# Patient Record
Sex: FEMALE | Race: BLACK OR AFRICAN AMERICAN | Employment: FULL TIME | URBAN - METROPOLITAN AREA
[De-identification: names, ages, dates, MRNs, and addresses within clinical notes are randomized per-mention and may not be internally consistent; named-entity substitution may affect disease eponyms.]

---

## 2017-03-11 DIAGNOSIS — I10 ESSENTIAL HYPERTENSION: Primary | ICD-10-CM

## 2017-03-13 RX ORDER — AMLODIPINE BESYLATE 5 MG/1
TABLET ORAL
Qty: 90 TABLET | Refills: 0 | Status: SHIPPED | OUTPATIENT
Start: 2017-03-13 | End: 2017-06-12

## 2017-03-15 ENCOUNTER — TELEPHONE (OUTPATIENT)
Dept: FAMILY MEDICINE CLINIC | Facility: CLINIC | Age: 57
End: 2017-03-15

## 2017-03-15 DIAGNOSIS — E05.00 GRAVES' DISEASE: ICD-10-CM

## 2017-03-15 DIAGNOSIS — Z00.00 LABORATORY EXAMINATION ORDERED AS PART OF A ROUTINE GENERAL MEDICAL EXAMINATION: Primary | ICD-10-CM

## 2017-03-15 NOTE — TELEPHONE ENCOUNTER
Patient is scheduled for a physical 08/07/17 and would like to have blood work done prior to appointment.  Please place orders to [facility] labs

## 2017-04-24 ENCOUNTER — TELEPHONE (OUTPATIENT)
Dept: FAMILY MEDICINE CLINIC | Facility: CLINIC | Age: 57
End: 2017-04-24

## 2017-04-24 NOTE — TELEPHONE ENCOUNTER
Called and talked to patient discussed getting B12 and what it does and doesn't do she will wait until appointment to discuss this with Dr Donavan Severe

## 2017-04-24 NOTE — TELEPHONE ENCOUNTER
Pt calling on status of B12 question. Was informed that message was sent to Dr Tom Donahue. (cell # [phone]. Michelle Pencil ok to leave message)

## 2017-04-24 NOTE — TELEPHONE ENCOUNTER
Pt is wondering if she can have a vitamin B-12 shot she is thinking that she would need this once a week.   She takes thyroid and blood pressure medicine and she believes it is giving her low energy and she was reccommended to take vitamins and she would ra

## 2017-04-24 NOTE — TELEPHONE ENCOUNTER
We can give the shot, but it will likely not be covered, also, it does not replace a multi vitamin or a b complex vaccine. Ideally she should discuss at a visit.

## 2017-06-12 DIAGNOSIS — I10 ESSENTIAL HYPERTENSION: Primary | ICD-10-CM

## 2017-06-14 RX ORDER — AMLODIPINE BESYLATE 5 MG/1
TABLET ORAL
Qty: 90 TABLET | Refills: 0 | Status: SHIPPED | OUTPATIENT
Start: 2017-06-14 | End: 2017-08-16

## 2017-06-14 NOTE — TELEPHONE ENCOUNTER
LOV 8/6/16 and at that time, pt was advised to follow up in 3 months.  Future Appointments  Date Time Provider Carlos Eduardo Preciado   6/27/2017 3:40 PM Earlyne Badger, MD Randalyn Corona Lazarus Gum   7/28/2017 7:15 AM REF JEFFY REF EMG3 Ref Jeffy   8/7/2017 12:45 P

## 2017-07-06 ENCOUNTER — TELEPHONE (OUTPATIENT)
Dept: FAMILY MEDICINE CLINIC | Facility: CLINIC | Age: 57
End: 2017-07-06

## 2017-07-06 NOTE — TELEPHONE ENCOUNTER
Patient came back from [facility], ongoing diarrhea. Prefers to speak to nurse before scheduling an appointment. Offered appt tomorrow with Dr. Jv Sims.

## 2017-07-06 NOTE — TELEPHONE ENCOUNTER
Has had diarrhea from Sunday when they got back from vacation, she asked the pharmacist what to do and they suggested the [address] but told her along with clear liquids it was OK to eat crackers.  So I told her that she should start out with 24 hours of cl

## 2017-07-07 ENCOUNTER — LAB ENCOUNTER (OUTPATIENT)
Dept: LAB | Facility: HOSPITAL | Age: 57
End: 2017-07-07
Attending: FAMILY MEDICINE
Payer: COMMERCIAL

## 2017-07-07 ENCOUNTER — OFFICE VISIT (OUTPATIENT)
Dept: FAMILY MEDICINE CLINIC | Facility: CLINIC | Age: 57
End: 2017-07-07

## 2017-07-07 VITALS
HEART RATE: 74 BPM | WEIGHT: 215 LBS | HEIGHT: 69 IN | DIASTOLIC BLOOD PRESSURE: 70 MMHG | SYSTOLIC BLOOD PRESSURE: 118 MMHG | BODY MASS INDEX: 31.84 KG/M2 | RESPIRATION RATE: 14 BRPM | TEMPERATURE: 99 F

## 2017-07-07 DIAGNOSIS — A09 TRAVELER'S DIARRHEA: Primary | ICD-10-CM

## 2017-07-07 DIAGNOSIS — A09 TRAVELER'S DIARRHEA: ICD-10-CM

## 2017-07-07 PROCEDURE — 87046 STOOL CULTR AEROBIC BACT EA: CPT

## 2017-07-07 PROCEDURE — 87045 FECES CULTURE AEROBIC BACT: CPT

## 2017-07-07 PROCEDURE — 87427 SHIGA-LIKE TOXIN AG IA: CPT

## 2017-07-07 PROCEDURE — 87077 CULTURE AEROBIC IDENTIFY: CPT

## 2017-07-07 PROCEDURE — 99213 OFFICE O/P EST LOW 20 MIN: CPT | Performed by: FAMILY MEDICINE

## 2017-07-07 PROCEDURE — 87493 C DIFF AMPLIFIED PROBE: CPT

## 2017-07-07 RX ORDER — CIPROFLOXACIN 500 MG/1
500 TABLET, FILM COATED ORAL 2 TIMES DAILY
Qty: 20 TABLET | Refills: 0 | Status: SHIPPED | OUTPATIENT
Start: 2017-07-07 | End: 2017-07-17

## 2017-07-07 NOTE — PROGRESS NOTES
Patient presents with:  Diarrhea: x7 days w/ diarrhea that is loose pt is unable to keep any food down. PT is had 7 BM today already.  Pt needs note for work      HPI:   This is a 61year old female coming in for area    [facility] x 5 days, [address] distress. Abdominal: Soft. Normal appearance and bowel sounds are normal. She exhibits no shifting dullness, no distension and no mass. There is no hepatosplenomegaly. There is generalized tenderness.  There is no rigidity, no rebound, no guarding, no CVA

## 2017-07-07 NOTE — PATIENT INSTRUCTIONS
Treating Diarrhea    Diarrhea happens when you have loose, watery, or frequent bowel movements. It is a common problem with many causes. Most cases of diarrhea clear up on their own. But certain cases may need treatment.  Be sure to see your healthcare pr © 2897-8379 [address], [address]. All rights reserved. This information is not intended as a substitute for professional medical care. Always follow your healthcare professional's instructions.

## 2017-07-11 ENCOUNTER — TELEPHONE (OUTPATIENT)
Dept: FAMILY MEDICINE CLINIC | Facility: CLINIC | Age: 57
End: 2017-07-11

## 2017-07-12 ENCOUNTER — TELEPHONE (OUTPATIENT)
Dept: FAMILY MEDICINE CLINIC | Facility: CLINIC | Age: 57
End: 2017-07-12

## 2017-07-12 NOTE — TELEPHONE ENCOUNTER
Spoke with Natalia Ruvalcaba, telling her Iza Vargas extended her return to work date to 7/20/17. Told her to increase her diet gradually and to add an OTC probiotic to help speed up recovery per . She is now concerned she has not moved her bowels for 2 days.

## 2017-07-12 NOTE — TELEPHONE ENCOUNTER
Eyad Velazco for extended note through 7/20 and OK for gradual increase in diet.  Ok for probiotic OTC to see if it speeds up process of recovery

## 2017-07-12 NOTE — PROGRESS NOTES
Discussed Stool culture results with Kathrine Webb by phone. [place] [employer] understanding. She is doing a lot better but still has no appetite and is afraid to eat anything but the BRAT diet suggested by Dorothy Young.   She took 5 days of Cipro but stopped [place]

## 2017-07-12 NOTE — TELEPHONE ENCOUNTER
Discussed Stool culture results with Angelo Woodward by phone regarding traveler's diarrhea. Caron Martínez understanding.  She is doing a lot better but still has no appetite and is afraid to eat anything but the BRAT diet suggested by Vidya Camara. Angelica Whitaker took 5 d

## 2017-07-12 NOTE — TELEPHONE ENCOUNTER
----- Message from Nicole Yao MD sent at 7/11/2017  6:57 PM CDT -----  Mild infection, this is a travelers diarrhea bug, and usually clears without abx. cipro is abx of choice if needed, and I started her on cipro last week.  Ok to notify and make sure sh

## 2017-08-09 ENCOUNTER — LABORATORY ENCOUNTER (OUTPATIENT)
Dept: LAB | Age: 57
End: 2017-08-09
Attending: FAMILY MEDICINE
Payer: COMMERCIAL

## 2017-08-09 DIAGNOSIS — Z00.00 LABORATORY EXAMINATION ORDERED AS PART OF A ROUTINE GENERAL MEDICAL EXAMINATION: ICD-10-CM

## 2017-08-09 DIAGNOSIS — E89.0 POSTABLATIVE HYPOTHYROIDISM: ICD-10-CM

## 2017-08-09 DIAGNOSIS — E55.9 VITAMIN D DEFICIENCY: ICD-10-CM

## 2017-08-09 LAB
ALBUMIN SERPL-MCNC: 3.7 G/DL (ref 3.5–4.8)
ALP LIVER SERPL-CCNC: 87 U/L (ref 46–118)
ALT SERPL-CCNC: 15 U/L (ref 14–54)
AST SERPL-CCNC: 11 U/L (ref 15–41)
BASOPHILS # BLD AUTO: 0.02 X10(3) UL (ref 0–0.1)
BASOPHILS NFR BLD AUTO: 0.3 %
BILIRUB SERPL-MCNC: 0.5 MG/DL (ref 0.1–2)
BUN BLD-MCNC: 13 MG/DL (ref 8–20)
CALCIUM BLD-MCNC: 9.3 MG/DL (ref 8.3–10.3)
CHLORIDE: 108 MMOL/L (ref 101–111)
CHOLEST SMN-MCNC: 186 MG/DL (ref ?–200)
CO2: 26 MMOL/L (ref 22–32)
CREAT BLD-MCNC: 1.14 MG/DL (ref 0.55–1.02)
EOSINOPHIL # BLD AUTO: 0.04 X10(3) UL (ref 0–0.3)
EOSINOPHIL NFR BLD AUTO: 0.6 %
ERYTHROCYTE [DISTWIDTH] IN BLOOD BY AUTOMATED COUNT: 14.2 % (ref 11.5–16)
GLUCOSE BLD-MCNC: 95 MG/DL (ref 70–99)
HCT VFR BLD AUTO: 37.4 % (ref 34–50)
HDLC SERPL-MCNC: 43 MG/DL (ref 45–?)
HDLC SERPL: 4.33 {RATIO} (ref ?–4.44)
HGB BLD-MCNC: 11.9 G/DL (ref 12–16)
IMMATURE GRANULOCYTE COUNT: 0.01 X10(3) UL (ref 0–1)
IMMATURE GRANULOCYTE RATIO %: 0.2 %
LDLC SERPL CALC-MCNC: 124 MG/DL (ref ?–130)
LDLC SERPL-MCNC: 19 MG/DL (ref 5–40)
LYMPHOCYTES # BLD AUTO: 3.55 X10(3) UL (ref 0.9–4)
LYMPHOCYTES NFR BLD AUTO: 56.9 %
M PROTEIN MFR SERPL ELPH: 7.6 G/DL (ref 6.1–8.3)
MCH RBC QN AUTO: 28.2 PG (ref 27–33.2)
MCHC RBC AUTO-ENTMCNC: 31.8 G/DL (ref 31–37)
MCV RBC AUTO: 88.6 FL (ref 81–100)
MONOCYTES # BLD AUTO: 0.45 X10(3) UL (ref 0.1–0.6)
MONOCYTES NFR BLD AUTO: 7.2 %
NEUTROPHIL ABS PRELIM: 2.17 X10 (3) UL (ref 1.3–6.7)
NEUTROPHILS # BLD AUTO: 2.17 X10(3) UL (ref 1.3–6.7)
NEUTROPHILS NFR BLD AUTO: 34.8 %
NONHDLC SERPL-MCNC: 143 MG/DL (ref ?–130)
PLATELET # BLD AUTO: 231 10(3)UL (ref 150–450)
POTASSIUM SERPL-SCNC: 3.8 MMOL/L (ref 3.6–5.1)
RBC # BLD AUTO: 4.22 X10(6)UL (ref 3.8–5.1)
RED CELL DISTRIBUTION WIDTH-SD: 45.6 FL (ref 35.1–46.3)
SODIUM SERPL-SCNC: 142 MMOL/L (ref 136–144)
TRIGLYCERIDES: 93 MG/DL (ref ?–150)
TSI SER-ACNC: 2.05 MIU/ML (ref 0.35–5.5)
WBC # BLD AUTO: 6.2 X10(3) UL (ref 4–13)

## 2017-08-09 PROCEDURE — 80053 COMPREHEN METABOLIC PANEL: CPT

## 2017-08-09 PROCEDURE — 85025 COMPLETE CBC W/AUTO DIFF WBC: CPT

## 2017-08-09 PROCEDURE — 36415 COLL VENOUS BLD VENIPUNCTURE: CPT

## 2017-08-09 PROCEDURE — 84443 ASSAY THYROID STIM HORMONE: CPT

## 2017-08-09 PROCEDURE — 80061 LIPID PANEL: CPT

## 2017-08-09 NOTE — PROGRESS NOTES
Please let pt know thyroid is normal but we can try to increase dose a little more. Stop all current dose of levothyroxine. Increase to levothyroxine 112 mcg daily and repeat blood work in 2 months, around 10/9.   Thanks,  Calvin's

## 2017-08-10 NOTE — PROGRESS NOTES
Telephone Information:  Mobile          [phone]    [facility] regarding Dr. Mike Pimentel result note. Hours and number given.

## 2017-08-11 NOTE — PROGRESS NOTES
Patient informed of Dr. Thacker Patch note. Patient verbalized understanding and agrees with plan.     Orders were placed and script was sent via

## 2017-08-11 NOTE — PROGRESS NOTES
[phone] (home)   Telephone Information:  Mobile          [phone] regarding Dr. Duff Face result note. Hours and number given.

## 2017-08-14 RX ORDER — ERGOCALCIFEROL 1.25 MG/1
50000 CAPSULE ORAL
Refills: 3 | COMMUNITY
Start: 2017-05-21 | End: 2017-10-13

## 2017-08-16 ENCOUNTER — OFFICE VISIT (OUTPATIENT)
Dept: FAMILY MEDICINE CLINIC | Facility: CLINIC | Age: 57
End: 2017-08-16

## 2017-08-16 VITALS
WEIGHT: 216.19 LBS | SYSTOLIC BLOOD PRESSURE: 110 MMHG | HEIGHT: 68.8 IN | RESPIRATION RATE: 16 BRPM | BODY MASS INDEX: 32.02 KG/M2 | DIASTOLIC BLOOD PRESSURE: 70 MMHG | TEMPERATURE: 98 F | HEART RATE: 68 BPM

## 2017-08-16 DIAGNOSIS — Z01.419 WELL WOMAN EXAM WITH ROUTINE GYNECOLOGICAL EXAM: ICD-10-CM

## 2017-08-16 DIAGNOSIS — Z00.00 ANNUAL PHYSICAL EXAM: Primary | ICD-10-CM

## 2017-08-16 DIAGNOSIS — I10 ESSENTIAL HYPERTENSION: ICD-10-CM

## 2017-08-16 DIAGNOSIS — Z12.31 VISIT FOR SCREENING MAMMOGRAM: ICD-10-CM

## 2017-08-16 DIAGNOSIS — R39.15 URGENCY OF URINATION: ICD-10-CM

## 2017-08-16 PROCEDURE — 87624 HPV HI-RISK TYP POOLED RSLT: CPT | Performed by: FAMILY MEDICINE

## 2017-08-16 PROCEDURE — 99396 PREV VISIT EST AGE 40-64: CPT | Performed by: FAMILY MEDICINE

## 2017-08-16 PROCEDURE — 88175 CYTOPATH C/V AUTO FLUID REDO: CPT | Performed by: FAMILY MEDICINE

## 2017-08-16 RX ORDER — AMLODIPINE BESYLATE 5 MG/1
5 TABLET ORAL DAILY
Qty: 90 TABLET | Refills: 3 | Status: SHIPPED | OUTPATIENT
Start: 2017-08-16 | End: 2018-08-20

## 2017-08-16 NOTE — PATIENT INSTRUCTIONS
Anatomy of the Female Urinary Tract  Your urinary tract helps get rid of urine (your body’s liquid waste). The kidneys collect chemicals and water your body doesn’t need. This is turned into urine.  Urine travels out of the kidneys through the ureters to · Losing weight. Excess weight puts extra pressure on the pelvic floor muscles. Exercising and eating right can help you lose weight. This helps other treatments work better. · Making certain diet changes.  Some foods may make you need to urinate more, so Urge incontinence (also called overactive bladder): With this type, a sudden urge to urinate is felt often. This may happen even though there may not be much urine in the bladder. The need to urinate often during the night is common.  Urge incontinence most · Quit smoking. Smoking and other tobacco use can lead to chronic cough that strains the pelvic floor muscles. Smoking may also damage the bladder and urethra. Talk with your provider about treatments or methods you can use to quit smoking.   · If drinking Urinary incontinence is the leaking of urine from the bladder. In some cases, medication can reduce or stop the leaking. It is mainly given for urge incontinence.  Your doctor will talk with you about your options.  Make sure to ask what side effects to exp

## 2017-08-16 NOTE — ASSESSMENT & PLAN NOTE
Handouts on Jaqueline's given, uro-GYN if no great improvement, we discussed pathophysiology and all treatment options as well

## 2017-08-16 NOTE — PROGRESS NOTES
Barrera Mendez is a 61year old female who presents for a complete physical exam.   HPI:   Patient presents with:   Well Adult: WWE with Pap  Urinary Urgency: related to age, not UTI      Her last annual assessment has been over 1 year: Annual Physical due 143 (H) 08/09/2017 07:09 AM         Lab Results  Component Value Date/Time   A1C 5.8 (H) 12/22/2014 12:11 PM   VITD 31.83 06/07/2017 03:50 PM          Last Heather category :Si Body due on 08/12/2017   [unfilled]  No results found.         Current Out negatives noted in the the HPI.   Specifically:  GEN:  No fever or fatigue  HEAD:  No headaches  EYES:  No vision change  EARS:  No hearing loss  MOUTH/THROAT:  No sore throat or dental problems  HEART:  No chest pain or palpitations  LUNG:  No SOB, cough o and no tenderness. Left breast exhibits no inverted nipple and no tenderness. Breasts are symmetrical.   Abdominal: Soft. Bowel sounds are normal. She exhibits no distension. There is no hepatosplenomegaly. There is no tenderness.  There is no rebound and n exam    -  Primary    Well woman exam with routine gynecological exam        Visit for screening mammogram        Relevant Orders    AYAN SCREENING BILAT (CPT=77067)         Return in about 6 months (around 2/16/2018) for recheck.     Maritza Ballard MD, 8/16/2

## 2017-08-17 LAB — HPV I/H RISK 1 DNA SPEC QL NAA+PROBE: NEGATIVE

## 2017-08-18 LAB — LAST PAP RESULT: NORMAL

## 2017-08-19 NOTE — PROGRESS NOTES
Discussed pap results with patient by phone telling her it was normal and to repeat the pap in 2-3 years per Clinton Lorenzo. She should return for a physical in 1 year. Patient verbalizes understanding.

## 2017-08-24 ENCOUNTER — HOSPITAL ENCOUNTER (OUTPATIENT)
Dept: MAMMOGRAPHY | Age: 57
Discharge: HOME | End: 2017-08-24
Attending: FAMILY MEDICINE
Payer: COMMERCIAL

## 2017-08-24 DIAGNOSIS — Z12.31 VISIT FOR SCREENING MAMMOGRAM: ICD-10-CM

## 2017-08-24 PROCEDURE — 77067 SCR MAMMO BI INCL CAD: CPT | Performed by: FAMILY MEDICINE

## 2017-11-29 ENCOUNTER — APPOINTMENT (OUTPATIENT)
Dept: GENERAL RADIOLOGY | Facility: HOSPITAL | Age: 57
End: 2017-11-29
Attending: EMERGENCY MEDICINE
Payer: COMMERCIAL

## 2017-11-29 ENCOUNTER — HOSPITAL ENCOUNTER (EMERGENCY)
Facility: HOSPITAL | Age: 57
Discharge: HOME | End: 2017-11-29
Attending: EMERGENCY MEDICINE
Payer: COMMERCIAL

## 2017-11-29 ENCOUNTER — TELEPHONE (OUTPATIENT)
Dept: FAMILY MEDICINE CLINIC | Facility: CLINIC | Age: 57
End: 2017-11-29

## 2017-11-29 VITALS
BODY MASS INDEX: 29.35 KG/M2 | TEMPERATURE: 98 F | OXYGEN SATURATION: 99 % | RESPIRATION RATE: 17 BRPM | HEART RATE: 62 BPM | DIASTOLIC BLOOD PRESSURE: 87 MMHG | HEIGHT: 70 IN | SYSTOLIC BLOOD PRESSURE: 127 MMHG | WEIGHT: 205 LBS

## 2017-11-29 DIAGNOSIS — R07.89 CHEST PAIN, NON-CARDIAC: Primary | ICD-10-CM

## 2017-11-29 PROCEDURE — 93005 ELECTROCARDIOGRAM TRACING: CPT

## 2017-11-29 PROCEDURE — 81003 URINALYSIS AUTO W/O SCOPE: CPT | Performed by: PHYSICIAN ASSISTANT

## 2017-11-29 PROCEDURE — 36415 COLL VENOUS BLD VENIPUNCTURE: CPT

## 2017-11-29 PROCEDURE — 93010 ELECTROCARDIOGRAM REPORT: CPT

## 2017-11-29 PROCEDURE — 84484 ASSAY OF TROPONIN QUANT: CPT | Performed by: EMERGENCY MEDICINE

## 2017-11-29 PROCEDURE — 85025 COMPLETE CBC W/AUTO DIFF WBC: CPT | Performed by: EMERGENCY MEDICINE

## 2017-11-29 PROCEDURE — 99285 EMERGENCY DEPT VISIT HI MDM: CPT

## 2017-11-29 PROCEDURE — 71010 XR CHEST AP PORTABLE  (CPT=71010): CPT | Performed by: EMERGENCY MEDICINE

## 2017-11-29 PROCEDURE — 80053 COMPREHEN METABOLIC PANEL: CPT | Performed by: EMERGENCY MEDICINE

## 2017-11-29 RX ORDER — ASPIRIN 81 MG/1
324 TABLET, CHEWABLE ORAL ONCE
Status: COMPLETED | OUTPATIENT
Start: 2017-11-29 | End: 2017-11-29

## 2017-11-29 NOTE — ED PROVIDER NOTES
Patient Seen in: [facility] Emergency Department    History   Patient presents with:  Chest Pain Angina (cardiovascular)    Stated Complaint: chest pain    HPI   CHIEF COMPLAINT: Chest pressure, left jaw pain    HISTORY OF PRESENT ILLNESS: Patient i History:   and :   1996: CHOLECYSTECTOMY  2010: COLONOSCOPY      Comment: Dr. Imtiaz Mosqueda- repeat in 5 yrs d/t suboptimal               prep                           Smoking status: Never Smoker Lymphocyte Absolute 4.01 (*)     All other components within normal limits   COMP METABOLIC PANEL (14) - Normal   TROPONIN I - Normal   TROPONIN I - Normal   CBC WITH DIFFERENTIAL WITH PLATELET    Narrative:      The following orders were created for panel (primary encounter diagnosis)    Disposition:  Discharge  11/29/2017  8:06 pm    Follow-up:  Edgar Lee MD  [address] [facility] [address] [place] [address] [phone]    Schedule an appointment as soon as possible for a visit in 3 days          [address] [place] [address]

## 2017-11-29 NOTE — TELEPHONE ENCOUNTER
Pt states that she is having left jaw pain since 11 am and mild chest pain. Denies SOB or diaphoresis. Advised that pt go to ER. She verbalized understanding. Routed to Dr Donavan Severe for sign off.

## 2017-11-29 NOTE — ED INITIAL ASSESSMENT (HPI)
Chest pain x 2 weeks intermittently. Pt states pain is tight and central chest.  Pt states left jaw pain today, pt states pain when moving jaw. Pt states pain is resolving. Pt also noted tingling to left arm.

## 2017-11-30 ENCOUNTER — TELEPHONE (OUTPATIENT)
Dept: FAMILY MEDICINE CLINIC | Facility: CLINIC | Age: 57
End: 2017-11-30

## 2017-11-30 DIAGNOSIS — I10 ESSENTIAL HYPERTENSION: ICD-10-CM

## 2017-11-30 DIAGNOSIS — R07.89 CHEST PRESSURE: Primary | ICD-10-CM

## 2017-11-30 NOTE — TELEPHONE ENCOUNTER
Ok for stress echo    Diagnoses and all orders for this visit:    Chest pressure  -     CARD ECHO STRESS ECHO/REST AND STRESS(CPT=93350/00231 DMG 25057);  Future    Essential hypertension  -     CARD ECHO STRESS ECHO/REST AND STRESS(CPT=93350/19068 DMG 9335

## 2017-11-30 NOTE — ED PROVIDER NOTES
I reviewed that chart and discussed the case. I have examined the patient and noted normal heart and lung exam.  Patient admits to being under a lot of stress related to work. She is afraid she is going to be laid off.     Electrocardiogram #1 at 1705 as

## 2017-11-30 NOTE — TELEPHONE ENCOUNTER
Patient was seen in ED yesterday for chest pain work up normal except EKG cannot  r/o anterior infarct they wanted her to get a stress test she would like to get this before seeing Dr Sabina Qureshi I will ask Dr Sabina Qureshi about this

## 2017-12-13 ENCOUNTER — HOSPITAL ENCOUNTER (OUTPATIENT)
Dept: CV DIAGNOSTICS | Facility: HOSPITAL | Age: 57
Discharge: HOME | End: 2017-12-13
Attending: FAMILY MEDICINE
Payer: COMMERCIAL

## 2017-12-13 DIAGNOSIS — I10 ESSENTIAL HYPERTENSION: ICD-10-CM

## 2017-12-13 DIAGNOSIS — R07.89 CHEST PRESSURE: ICD-10-CM

## 2017-12-13 PROCEDURE — 93018 CV STRESS TEST I&R ONLY: CPT | Performed by: FAMILY MEDICINE

## 2017-12-13 PROCEDURE — 93017 CV STRESS TEST TRACING ONLY: CPT | Performed by: FAMILY MEDICINE

## 2017-12-13 PROCEDURE — 93350 STRESS TTE ONLY: CPT | Performed by: FAMILY MEDICINE

## 2017-12-14 ENCOUNTER — OFFICE VISIT (OUTPATIENT)
Dept: FAMILY MEDICINE CLINIC | Facility: CLINIC | Age: 57
End: 2017-12-14

## 2017-12-14 VITALS
SYSTOLIC BLOOD PRESSURE: 126 MMHG | WEIGHT: 224 LBS | RESPIRATION RATE: 16 BRPM | BODY MASS INDEX: 32 KG/M2 | HEART RATE: 70 BPM | DIASTOLIC BLOOD PRESSURE: 76 MMHG | TEMPERATURE: 98 F

## 2017-12-14 DIAGNOSIS — K13.79 ORAL MASS: Primary | ICD-10-CM

## 2017-12-14 PROCEDURE — 99213 OFFICE O/P EST LOW 20 MIN: CPT | Performed by: PHYSICIAN ASSISTANT

## 2017-12-14 RX ORDER — CLINDAMYCIN HYDROCHLORIDE 150 MG/1
150 CAPSULE ORAL 3 TIMES DAILY
COMMUNITY
End: 2018-08-20 | Stop reason: ALTCHOICE

## 2017-12-15 ENCOUNTER — HOSPITAL ENCOUNTER (OUTPATIENT)
Dept: ULTRASOUND IMAGING | Age: 57
Discharge: HOME | End: 2017-12-15
Attending: PHYSICIAN ASSISTANT
Payer: COMMERCIAL

## 2017-12-15 DIAGNOSIS — K13.79 ORAL MASS: ICD-10-CM

## 2017-12-15 PROCEDURE — 76536 US EXAM OF HEAD AND NECK: CPT | Performed by: PHYSICIAN ASSISTANT

## 2017-12-15 NOTE — PROGRESS NOTES
CC:  Patient presents with:  Swelling: jaw swelling saw dentist has had this for 2 weeks now on Lt side dentist stated it was not dental in nature pain with touch denies fever       HPI: Mildred Gaston presents with complaints of painful swelling of the left buc reviewed.     Constitutional: Vital signs reviewed as noted; well developed, well nourished; in no acute distress  HENT:  Head: Normocephalic, atraumatic  Ears: Normal external ears; canals clear; TMs clear with landmarks visible  Nose: No edema, bleeding, prescriptions requested or ordered in this encounter

## 2017-12-18 ENCOUNTER — TELEPHONE (OUTPATIENT)
Dept: FAMILY MEDICINE CLINIC | Facility: CLINIC | Age: 57
End: 2017-12-18

## 2017-12-19 ENCOUNTER — HOSPITAL ENCOUNTER (OUTPATIENT)
Dept: CT IMAGING | Facility: HOSPITAL | Age: 57
Discharge: HOME | End: 2017-12-19
Attending: PHYSICIAN ASSISTANT
Payer: COMMERCIAL

## 2017-12-19 DIAGNOSIS — K11.8 PAROTID MASS: ICD-10-CM

## 2017-12-19 DIAGNOSIS — K11.20 SIALADENITIS: ICD-10-CM

## 2017-12-19 PROCEDURE — 70492 CT SFT TSUE NCK W/O & W/DYE: CPT | Performed by: PHYSICIAN ASSISTANT

## 2017-12-20 NOTE — PROGRESS NOTES
Informed pt of results per KO: ct neck is showing no significant abnormalities seen in the area of concern, recommend a follow up in office with Dr Wallace Hernandez in the next week to re examine with the films. Overall negative scan    Pt voiced understanding.   Gerard Garcia

## 2017-12-20 NOTE — PROGRESS NOTES
Please inform ct neck is showing no significant abnormalities seen in the area of concern, recommend a follow up in office with Dr Connie Avelar in the next week to re examine with the films.  Overall negative scan

## 2018-01-03 ENCOUNTER — TELEPHONE (OUTPATIENT)
Dept: FAMILY MEDICINE CLINIC | Facility: CLINIC | Age: 58
End: 2018-01-03

## 2018-01-03 NOTE — TELEPHONE ENCOUNTER
Called and talked to patient she fell yesterday in the bath tub and had LOC maybe up to 20 min seen by paramedics but refused transport to the hospital now she has headache balance is ok A&O X3 speech clear she is at work now will talk to Dr Delbert Ortega about [facility]

## 2018-01-03 NOTE — TELEPHONE ENCOUNTER
Called patient back and made an appointment for tomorrow at 10:45 with Dr Ernesto High discussed warning symptoms of head injury headache blurred vision slurred speech increased somnolence she agreed to go to hospital if things worsen

## 2018-01-03 NOTE — TELEPHONE ENCOUNTER
Patient fell getting out of tub yesterday, paramedics were called, patient refused to go to ER. Patient does have a knot on her head and has headache.  Patient does not want to go to ER but is looking to be seen today

## 2018-01-03 NOTE — TELEPHONE ENCOUNTER
Coming into the office would be better option unless she gets more more confused in which case she should go right to the emergency room.   Sometimes there can be mental confusion 7-10 days after a loss of consciousness like this, and in that case she shoul

## 2018-01-04 ENCOUNTER — OFFICE VISIT (OUTPATIENT)
Dept: FAMILY MEDICINE CLINIC | Facility: CLINIC | Age: 58
End: 2018-01-04

## 2018-01-04 VITALS
DIASTOLIC BLOOD PRESSURE: 64 MMHG | HEART RATE: 76 BPM | SYSTOLIC BLOOD PRESSURE: 110 MMHG | RESPIRATION RATE: 16 BRPM | TEMPERATURE: 97 F

## 2018-01-04 DIAGNOSIS — S06.0X1A CONCUSSION WITH LOSS OF CONSCIOUSNESS OF 30 MINUTES OR LESS, INITIAL ENCOUNTER: ICD-10-CM

## 2018-01-04 DIAGNOSIS — R55 VASOVAGAL SYNCOPE: Primary | ICD-10-CM

## 2018-01-04 PROCEDURE — 99214 OFFICE O/P EST MOD 30 MIN: CPT | Performed by: FAMILY MEDICINE

## 2018-01-04 NOTE — PROGRESS NOTES
Patient presents with:  Fall: On tuesday patient fell in the bath tub. Pt has bruises on left side and hit head with LOC. HPI:   This is a 61year old female coming in for loss of consciousness in the bathroom after taking a bath 2 days ago.   She rem difficulty urinating. Musculoskeletal: Negative for joint swelling. Skin: Negative. Negative for rash. Neurological: Positive for syncope. Negative for dizziness, facial asymmetry and weakness.    Psychiatric/Behavioral: Negative for confusion and ag

## 2018-08-10 ENCOUNTER — TELEPHONE (OUTPATIENT)
Dept: FAMILY MEDICINE CLINIC | Facility: CLINIC | Age: 58
End: 2018-08-10

## 2018-08-10 DIAGNOSIS — Z12.31 VISIT FOR SCREENING MAMMOGRAM: ICD-10-CM

## 2018-08-10 DIAGNOSIS — Z11.59 ENCOUNTER FOR HEPATITIS C SCREENING TEST FOR LOW RISK PATIENT: ICD-10-CM

## 2018-08-10 DIAGNOSIS — Z00.00 LABORATORY EXAMINATION ORDERED AS PART OF A ROUTINE GENERAL MEDICAL EXAMINATION: ICD-10-CM

## 2018-08-10 NOTE — TELEPHONE ENCOUNTER
Patient is currently at [facility] and needs labs entered in.  Patient Is scheduled for physical with Dr. Santiago Lim 8/20

## 2018-08-13 NOTE — TELEPHONE ENCOUNTER
This appt was schedule back in April 2018, no telephone encounter started for labs to be placed.  msg forward to Hui Abernathy

## 2018-08-15 ENCOUNTER — LAB ENCOUNTER (OUTPATIENT)
Dept: LAB | Age: 58
End: 2018-08-15
Attending: FAMILY MEDICINE
Payer: COMMERCIAL

## 2018-08-15 DIAGNOSIS — Z11.59 ENCOUNTER FOR HEPATITIS C SCREENING TEST FOR LOW RISK PATIENT: ICD-10-CM

## 2018-08-15 DIAGNOSIS — Z00.00 LABORATORY EXAMINATION ORDERED AS PART OF A ROUTINE GENERAL MEDICAL EXAMINATION: ICD-10-CM

## 2018-08-15 LAB
ALBUMIN SERPL-MCNC: 3.7 G/DL (ref 3.5–4.8)
ALBUMIN/GLOB SERPL: 0.9 {RATIO} (ref 1–2)
ALP LIVER SERPL-CCNC: 79 U/L (ref 46–118)
ALT SERPL-CCNC: 15 U/L (ref 14–54)
ANION GAP SERPL CALC-SCNC: 10 MMOL/L (ref 0–18)
AST SERPL-CCNC: 14 U/L (ref 15–41)
BASOPHILS # BLD AUTO: 0.02 X10(3) UL (ref 0–0.1)
BASOPHILS NFR BLD AUTO: 0.4 %
BILIRUB SERPL-MCNC: 0.5 MG/DL (ref 0.1–2)
BUN BLD-MCNC: 11 MG/DL (ref 8–20)
BUN/CREAT SERPL: 10.1 (ref 10–20)
CALCIUM BLD-MCNC: 8.9 MG/DL (ref 8.3–10.3)
CHLORIDE SERPL-SCNC: 108 MMOL/L (ref 101–111)
CHOLEST SMN-MCNC: 197 MG/DL (ref ?–200)
CO2 SERPL-SCNC: 25 MMOL/L (ref 22–32)
CREAT BLD-MCNC: 1.09 MG/DL (ref 0.55–1.02)
EOSINOPHIL # BLD AUTO: 0.06 X10(3) UL (ref 0–0.3)
EOSINOPHIL NFR BLD AUTO: 1.1 %
ERYTHROCYTE [DISTWIDTH] IN BLOOD BY AUTOMATED COUNT: 14.1 % (ref 11.5–16)
GLOBULIN PLAS-MCNC: 4 G/DL (ref 2.5–3.7)
GLUCOSE BLD-MCNC: 93 MG/DL (ref 70–99)
HCT VFR BLD AUTO: 38.2 % (ref 34–50)
HCV AB SERPL QL IA: NONREACTIVE
HDLC SERPL-MCNC: 46 MG/DL (ref 40–59)
HGB BLD-MCNC: 12 G/DL (ref 12–16)
IMMATURE GRANULOCYTE COUNT: 0.01 X10(3) UL (ref 0–1)
IMMATURE GRANULOCYTE RATIO %: 0.2 %
LDLC SERPL CALC-MCNC: 127 MG/DL (ref ?–100)
LYMPHOCYTES # BLD AUTO: 2.94 X10(3) UL (ref 0.9–4)
LYMPHOCYTES NFR BLD AUTO: 56.1 %
M PROTEIN MFR SERPL ELPH: 7.7 G/DL (ref 6.1–8.3)
MCH RBC QN AUTO: 28.4 PG (ref 27–33.2)
MCHC RBC AUTO-ENTMCNC: 31.4 G/DL (ref 31–37)
MCV RBC AUTO: 90.3 FL (ref 81–100)
MONOCYTES # BLD AUTO: 0.36 X10(3) UL (ref 0.1–1)
MONOCYTES NFR BLD AUTO: 6.9 %
NEUTROPHIL ABS PRELIM: 1.85 X10 (3) UL (ref 1.3–6.7)
NEUTROPHILS # BLD AUTO: 1.85 X10(3) UL (ref 1.3–6.7)
NEUTROPHILS NFR BLD AUTO: 35.3 %
NONHDLC SERPL-MCNC: 151 MG/DL (ref ?–130)
OSMOLALITY SERPL CALC.SUM OF ELEC: 295 MOSM/KG (ref 275–295)
PLATELET # BLD AUTO: 214 10(3)UL (ref 150–450)
POTASSIUM SERPL-SCNC: 4.2 MMOL/L (ref 3.6–5.1)
RBC # BLD AUTO: 4.23 X10(6)UL (ref 3.8–5.1)
RED CELL DISTRIBUTION WIDTH-SD: 46.3 FL (ref 35.1–46.3)
SODIUM SERPL-SCNC: 143 MMOL/L (ref 136–144)
TRIGL SERPL-MCNC: 118 MG/DL (ref 30–149)
VLDLC SERPL CALC-MCNC: 24 MG/DL (ref 0–30)
WBC # BLD AUTO: 5.2 X10(3) UL (ref 4–13)

## 2018-08-15 PROCEDURE — 85025 COMPLETE CBC W/AUTO DIFF WBC: CPT

## 2018-08-15 PROCEDURE — 80053 COMPREHEN METABOLIC PANEL: CPT

## 2018-08-15 PROCEDURE — 80061 LIPID PANEL: CPT

## 2018-08-15 PROCEDURE — 86803 HEPATITIS C AB TEST: CPT

## 2018-08-16 NOTE — PROGRESS NOTES
OM for Jayme Waller, telling her labs are stable and Stuart Saldivar will review them at her appointment Monday, 8/20/18.

## 2018-08-20 ENCOUNTER — OFFICE VISIT (OUTPATIENT)
Dept: FAMILY MEDICINE CLINIC | Facility: CLINIC | Age: 58
End: 2018-08-20
Payer: COMMERCIAL

## 2018-08-20 VITALS
DIASTOLIC BLOOD PRESSURE: 70 MMHG | HEART RATE: 60 BPM | TEMPERATURE: 97 F | WEIGHT: 217 LBS | BODY MASS INDEX: 32.14 KG/M2 | RESPIRATION RATE: 16 BRPM | HEIGHT: 68.75 IN | SYSTOLIC BLOOD PRESSURE: 128 MMHG

## 2018-08-20 DIAGNOSIS — Z01.419 VISIT FOR GYNECOLOGIC EXAMINATION: ICD-10-CM

## 2018-08-20 DIAGNOSIS — I10 ESSENTIAL HYPERTENSION: ICD-10-CM

## 2018-08-20 DIAGNOSIS — E05.00 GRAVES' DISEASE: ICD-10-CM

## 2018-08-20 DIAGNOSIS — Z00.00 ANNUAL PHYSICAL EXAM: Primary | ICD-10-CM

## 2018-08-20 PROCEDURE — 99396 PREV VISIT EST AGE 40-64: CPT | Performed by: FAMILY MEDICINE

## 2018-08-20 PROCEDURE — 88175 CYTOPATH C/V AUTO FLUID REDO: CPT | Performed by: FAMILY MEDICINE

## 2018-08-20 PROCEDURE — 87624 HPV HI-RISK TYP POOLED RSLT: CPT | Performed by: FAMILY MEDICINE

## 2018-08-20 RX ORDER — AMLODIPINE BESYLATE 5 MG/1
5 TABLET ORAL DAILY
Qty: 90 TABLET | Refills: 3 | Status: SHIPPED | OUTPATIENT
Start: 2018-08-20 | End: 2019-08-26

## 2018-08-20 NOTE — PROGRESS NOTES
Kamille Grey is a 61year old female who presents for a complete physical exam.   HPI:   Patient presents with:  Physical  Testing: will be due for mammogran   Pap: last pap 8/17/18     Her last annual assessment has been over 1 year: Annual Physical TRIG 118 08/15/2018 07:49 AM    (H) 08/15/2018 07:49 AM   NONHDLC 151 (H) 08/15/2018 07:49 AM         Lab Results  Component Value Date/Time   A1C 5.8 (H) 12/22/2014 12:11 PM   VITD 31.83 06/07/2017 03:50 PM          Last Heather category :Mammogram d Pap (date):  1 year ago, not sexually active x 2 years since divorce, but no hx STI  Hx of STDs: No    REVIEW OF SYSTEMS:   A comprehensive 14 point review of systems was completed. Pertinent positives and negatives noted in the the HPI.   Specifically:  G Effort normal and breath sounds normal. No respiratory distress. She has no decreased breath sounds. She has no wheezes. She has no rales. She exhibits no mass and no tenderness. Right breast exhibits no inverted nipple and no tenderness.  Left breast exhib patient is asked to return for CPX in 1 years. Assessment:  Problem List Items Addressed This Visit        Cardiovascular    Essential hypertension    Overview     Amlodipine 5         Current Assessment & Plan     Stable, Continue present management.

## 2018-08-21 LAB — HPV I/H RISK 1 DNA SPEC QL NAA+PROBE: NEGATIVE

## 2018-08-21 NOTE — ASSESSMENT & PLAN NOTE
Stable, Continue present management.     Thyroid  (most recent labs)     Lab Results  Component Value Date/Time   TSH 2.527 06/21/2018 04:02 PM   T4F 1.31 06/21/2018 04:02 PM   TSHT4 <0.01 (L) 12/22/2014 12:11 PM         Endocrine Medications          Levot

## 2018-08-21 NOTE — ASSESSMENT & PLAN NOTE
Stable, Continue present management.     Blood Pressure and Cardiac Medications          AmLODIPine Besylate 5 MG Oral Tab

## 2018-08-25 ENCOUNTER — HOSPITAL ENCOUNTER (OUTPATIENT)
Dept: MAMMOGRAPHY | Age: 58
Discharge: HOME | End: 2018-08-25
Attending: FAMILY MEDICINE
Payer: COMMERCIAL

## 2018-08-25 DIAGNOSIS — Z12.31 VISIT FOR SCREENING MAMMOGRAM: ICD-10-CM

## 2018-08-25 PROCEDURE — 77067 SCR MAMMO BI INCL CAD: CPT | Performed by: FAMILY MEDICINE

## 2018-08-25 PROCEDURE — 77063 BREAST TOMOSYNTHESIS BI: CPT | Performed by: FAMILY MEDICINE

## 2018-08-30 ENCOUNTER — LABORATORY ENCOUNTER (OUTPATIENT)
Dept: LAB | Age: 58
End: 2018-08-30
Attending: FAMILY MEDICINE
Payer: COMMERCIAL

## 2018-08-30 DIAGNOSIS — E55.9 VITAMIN D DEFICIENCY: ICD-10-CM

## 2018-08-30 DIAGNOSIS — E89.0 POSTABLATIVE HYPOTHYROIDISM: ICD-10-CM

## 2018-08-30 LAB
EST. AVERAGE GLUCOSE BLD GHB EST-MCNC: 120 MG/DL (ref 68–126)
HBA1C MFR BLD HPLC: 5.8 % (ref ?–5.7)
TSI SER-ACNC: 1.07 MIU/ML (ref 0.35–5.5)
VIT D+METAB SERPL-MCNC: 21.4 NG/ML (ref 30–100)

## 2018-08-30 PROCEDURE — 82306 VITAMIN D 25 HYDROXY: CPT

## 2018-08-30 PROCEDURE — 83036 HEMOGLOBIN GLYCOSYLATED A1C: CPT

## 2018-08-30 PROCEDURE — 84443 ASSAY THYROID STIM HORMONE: CPT

## 2018-09-04 NOTE — PROGRESS NOTES
Primary patient preferred number  [phone] Cell      LVMTCB regarding Dr. Maci Greene note. Hours and number given.

## 2018-09-04 NOTE — PROGRESS NOTES
Please let pt know that A1c is unchanged in the prediabetes level of 5.8%. Continue lifestyle efforts like reducing carbs and increasing activity to keep this stable. Thyroid has improved - continue levothyroxine 125 mcg daily.   Change vitamin D 50,000 u

## 2019-02-13 ENCOUNTER — TELEPHONE (OUTPATIENT)
Dept: FAMILY MEDICINE CLINIC | Facility: CLINIC | Age: 59
End: 2019-02-13

## 2019-02-13 ENCOUNTER — OFFICE VISIT (OUTPATIENT)
Dept: FAMILY MEDICINE CLINIC | Facility: CLINIC | Age: 59
End: 2019-02-13
Payer: COMMERCIAL

## 2019-02-13 VITALS — SYSTOLIC BLOOD PRESSURE: 126 MMHG | DIASTOLIC BLOOD PRESSURE: 72 MMHG

## 2019-02-13 DIAGNOSIS — R39.15 URGENCY OF URINATION: Primary | ICD-10-CM

## 2019-02-13 PROCEDURE — 99214 OFFICE O/P EST MOD 30 MIN: CPT | Performed by: FAMILY MEDICINE

## 2019-02-13 RX ORDER — FLUCONAZOLE 150 MG/1
150 TABLET ORAL
Qty: 2 TABLET | Refills: 0 | Status: SHIPPED | OUTPATIENT
Start: 2019-02-13 | End: 2021-04-01

## 2019-02-13 NOTE — PROGRESS NOTES
Patient presents with:  Urinary Frequency      Subjective   HPI:   This is a 60year old female coming in for urinary urgency and occasional loss of urine. She also does not feel freshened in the past Diflucan to help with this.   She is not currently sexu Working on Yair Lowery and option for [employer]         Relevant Medications    Mirabegron ER (MYRBETRIQ) 25 MG Oral Tablet 24 Hr      Trial of Myrbetriq, options discussed and risks and benefits explained.   Consider urogynecology evaluation if no great improvem

## 2019-02-13 NOTE — TELEPHONE ENCOUNTER
Please enter lab orders for the patient's upcoming physical appointment. Physical scheduled:    Your appointments     Date & Time Appointment Department [facility])    Aug 22, 2019  8:30 AM CDT Physical - Established Patient with MD Judith Jordan

## 2019-02-19 NOTE — TELEPHONE ENCOUNTER
Appointment Corrected  Please enter lab orders for the patient's upcoming physical appointment. Physical scheduled:    Your appointments     Date & Time Appointment Department [facility])    Aug 22, 2019  8:30 AM CDT Physical - Established Patient with Gla

## 2019-05-17 PROBLEM — R73.03 PREDIABETES: Status: ACTIVE | Noted: 2019-05-17

## 2019-05-17 PROBLEM — E89.0 POSTABLATIVE HYPOTHYROIDISM: Status: ACTIVE | Noted: 2019-05-17

## 2019-07-17 ENCOUNTER — TELEPHONE (OUTPATIENT)
Dept: FAMILY MEDICINE CLINIC | Facility: CLINIC | Age: 59
End: 2019-07-17

## 2019-07-17 ENCOUNTER — APPOINTMENT (OUTPATIENT)
Dept: LAB | Age: 59
End: 2019-07-17
Attending: FAMILY MEDICINE
Payer: COMMERCIAL

## 2019-07-17 ENCOUNTER — OFFICE VISIT (OUTPATIENT)
Dept: FAMILY MEDICINE CLINIC | Facility: CLINIC | Age: 59
End: 2019-07-17
Payer: COMMERCIAL

## 2019-07-17 VITALS
SYSTOLIC BLOOD PRESSURE: 120 MMHG | TEMPERATURE: 97 F | RESPIRATION RATE: 16 BRPM | HEART RATE: 80 BPM | DIASTOLIC BLOOD PRESSURE: 78 MMHG

## 2019-07-17 DIAGNOSIS — E05.00 GRAVES' DISEASE: ICD-10-CM

## 2019-07-17 DIAGNOSIS — R73.9 HYPERGLYCEMIA: ICD-10-CM

## 2019-07-17 DIAGNOSIS — N81.4 UTEROVAGINAL PROLAPSE: ICD-10-CM

## 2019-07-17 DIAGNOSIS — Z00.00 LABORATORY EXAMINATION ORDERED AS PART OF A ROUTINE GENERAL MEDICAL EXAMINATION: ICD-10-CM

## 2019-07-17 DIAGNOSIS — J01.41 ACUTE RECURRENT PANSINUSITIS: Primary | ICD-10-CM

## 2019-07-17 DIAGNOSIS — Z12.31 VISIT FOR SCREENING MAMMOGRAM: ICD-10-CM

## 2019-07-17 DIAGNOSIS — R73.03 PREDIABETES: ICD-10-CM

## 2019-07-17 LAB
ALBUMIN SERPL-MCNC: 3.7 G/DL (ref 3.4–5)
ALBUMIN/GLOB SERPL: 0.9 {RATIO} (ref 1–2)
ALP LIVER SERPL-CCNC: 86 U/L (ref 46–118)
ALT SERPL-CCNC: 13 U/L (ref 13–56)
ANION GAP SERPL CALC-SCNC: 4 MMOL/L (ref 0–18)
AST SERPL-CCNC: 17 U/L (ref 15–37)
BILIRUB SERPL-MCNC: 0.4 MG/DL (ref 0.1–2)
BUN BLD-MCNC: 9 MG/DL (ref 7–18)
BUN/CREAT SERPL: 7.7 (ref 10–20)
CALCIUM BLD-MCNC: 8.8 MG/DL (ref 8.5–10.1)
CHLORIDE SERPL-SCNC: 109 MMOL/L (ref 98–112)
CHOLEST SMN-MCNC: 190 MG/DL (ref ?–200)
CO2 SERPL-SCNC: 29 MMOL/L (ref 21–32)
CREAT BLD-MCNC: 1.17 MG/DL (ref 0.55–1.02)
DEPRECATED RDW RBC AUTO: 46.6 FL (ref 35.1–46.3)
ERYTHROCYTE [DISTWIDTH] IN BLOOD BY AUTOMATED COUNT: 13.9 % (ref 11–15)
EST. AVERAGE GLUCOSE BLD GHB EST-MCNC: 126 MG/DL (ref 68–126)
GLOBULIN PLAS-MCNC: 4.1 G/DL (ref 2.8–4.4)
GLUCOSE BLD-MCNC: 97 MG/DL (ref 70–99)
HBA1C MFR BLD HPLC: 6 % (ref ?–5.7)
HCT VFR BLD AUTO: 37.9 % (ref 35–48)
HDLC SERPL-MCNC: 46 MG/DL (ref 40–59)
HGB BLD-MCNC: 11.9 G/DL (ref 12–16)
LDLC SERPL CALC-MCNC: 125 MG/DL (ref ?–100)
M PROTEIN MFR SERPL ELPH: 7.8 G/DL (ref 6.4–8.2)
MCH RBC QN AUTO: 28.6 PG (ref 26–34)
MCHC RBC AUTO-ENTMCNC: 31.4 G/DL (ref 31–37)
MCV RBC AUTO: 91.1 FL (ref 80–100)
NONHDLC SERPL-MCNC: 144 MG/DL (ref ?–130)
OSMOLALITY SERPL CALC.SUM OF ELEC: 293 MOSM/KG (ref 275–295)
PLATELET # BLD AUTO: 250 10(3)UL (ref 150–450)
POTASSIUM SERPL-SCNC: 3.9 MMOL/L (ref 3.5–5.1)
RBC # BLD AUTO: 4.16 X10(6)UL (ref 3.8–5.3)
SODIUM SERPL-SCNC: 142 MMOL/L (ref 136–145)
T4 FREE SERPL-MCNC: 1.4 NG/DL (ref 0.8–1.7)
TRIGL SERPL-MCNC: 96 MG/DL (ref 30–149)
TSI SER-ACNC: 0.47 MIU/ML (ref 0.36–3.74)
VLDLC SERPL CALC-MCNC: 19 MG/DL (ref 0–30)
WBC # BLD AUTO: 6.2 X10(3) UL (ref 4–11)

## 2019-07-17 PROCEDURE — 84443 ASSAY THYROID STIM HORMONE: CPT

## 2019-07-17 PROCEDURE — 99214 OFFICE O/P EST MOD 30 MIN: CPT | Performed by: FAMILY MEDICINE

## 2019-07-17 PROCEDURE — 80053 COMPREHEN METABOLIC PANEL: CPT

## 2019-07-17 PROCEDURE — 80061 LIPID PANEL: CPT

## 2019-07-17 PROCEDURE — 85027 COMPLETE CBC AUTOMATED: CPT

## 2019-07-17 PROCEDURE — 83036 HEMOGLOBIN GLYCOSYLATED A1C: CPT

## 2019-07-17 PROCEDURE — 84439 ASSAY OF FREE THYROXINE: CPT

## 2019-07-17 RX ORDER — DOXYCYCLINE HYCLATE 100 MG
100 TABLET ORAL 2 TIMES DAILY
Qty: 20 TABLET | Refills: 0 | Status: SHIPPED | OUTPATIENT
Start: 2019-07-17 | End: 2019-07-27

## 2019-07-17 NOTE — PROGRESS NOTES
Patient presents with:  Cough: x 2 weeks   Abdominal Pain      Subjective   HPI:   This is a 60year old female coming in for 2 weeks of cough    HPI   See reviewed tab for PMSFHx  REVIEW OF SYSTEMS:   GENERAL HEALTH: feels well otherwise  Review of System normal, S2 normal and normal heart sounds. No murmur heard. Pulses:       Posterior tibial pulses are 2+ on the right side, and 2+ on the left side. Edema not present.   Pulmonary/Chest: Effort normal and breath sounds normal. No respiratory distress sinusitis. No recent antibiotics but this is recurrent from about 5 years ago. Abdominal pain likely uterine prolapse. Referral to uro-GYN. Return in about 6 weeks (around 8/28/2019) for as previously scheduled.     Merna Garcia MD, 7/17/2019, 9:16 AM

## 2019-07-17 NOTE — TELEPHONE ENCOUNTER
Please enter lab orders for the patient's upcoming physical appointment. Physical scheduled:    Your appointments     Date & Time Appointment Department [facility])    Aug 22, 2019  8:30 AM CDT Physical - Established Patient with MD Jose Mayes

## 2019-07-29 ENCOUNTER — TELEPHONE (OUTPATIENT)
Dept: FAMILY MEDICINE CLINIC | Facility: CLINIC | Age: 59
End: 2019-07-29

## 2019-07-29 NOTE — TELEPHONE ENCOUNTER
Dr. Tom Donahue patient last visit with you was 7/17/2019 given referral to uro/gyne. At her visit with you the pain she was having was a 6 or 7 on a scale of 1 to 10. Patient now states the pain is a 10. She can't even lie down without having pain.    Is Daryle Lion

## 2019-07-29 NOTE — TELEPHONE ENCOUNTER
Pt calling back on status of her call. Was informed that message was sent to [facility]. Please call her as she is off work on her cell. .. [phone]

## 2019-07-29 NOTE — TELEPHONE ENCOUNTER
Patient was seen on 7/17 for probably bladder prolapse. Referred to urogyne, but unable to get in in a timely manner. She is asking if there is someone else she can see within her network?     Routed to Darrion Suresh

## 2019-07-29 NOTE — TELEPHONE ENCOUNTER
Patient prefers not to go the ER. She did take her mother to the doctor today so perhaps pain is not truly a 8. She took appt tomorrow with Dr. Kavya Elizabeth M.D.at 4:30 pm.  Encouraged patient if pain is intense to go to the ER.    She understands and will g

## 2019-07-29 NOTE — TELEPHONE ENCOUNTER
If pains severe, like 10/10 may need to be seen in ER for imaging workup. If prolapse is suspected, this would need to be evlausted by the urology team.  Im happy to help if I am able to, but she may be outside of my scope already.   Let me know if I need

## 2019-07-29 NOTE — TELEPHONE ENCOUNTER
Pt requesting to speak to a nurse regarding discomfort with Bladder. Having pain but not sure exactly where it's coming from?

## 2019-07-30 ENCOUNTER — OFFICE VISIT (OUTPATIENT)
Dept: FAMILY MEDICINE CLINIC | Facility: CLINIC | Age: 59
End: 2019-07-30
Payer: COMMERCIAL

## 2019-07-30 VITALS
BODY MASS INDEX: 30 KG/M2 | DIASTOLIC BLOOD PRESSURE: 74 MMHG | SYSTOLIC BLOOD PRESSURE: 122 MMHG | HEART RATE: 72 BPM | WEIGHT: 201 LBS | TEMPERATURE: 98 F

## 2019-07-30 DIAGNOSIS — R10.2 PELVIC PAIN: Primary | ICD-10-CM

## 2019-07-30 DIAGNOSIS — N81.4 UTEROVAGINAL PROLAPSE: ICD-10-CM

## 2019-07-30 LAB
BILIRUBIN: NEGATIVE
GLUCOSE (URINE DIPSTICK): NEGATIVE MG/DL
KETONES (URINE DIPSTICK): NEGATIVE MG/DL
LEUKOCYTES: NEGATIVE
MULTISTIX LOT#: NORMAL NUMERIC
NITRITE, URINE: NEGATIVE
OCCULT BLOOD: NEGATIVE
PH, URINE: 7 (ref 4.5–8)
SPECIFIC GRAVITY: 1.02 (ref 1–1.03)
UROBILINOGEN,SEMI-QN: 1 MG/DL (ref 0–1.9)

## 2019-07-30 PROCEDURE — 99213 OFFICE O/P EST LOW 20 MIN: CPT | Performed by: FAMILY MEDICINE

## 2019-07-30 PROCEDURE — 81003 URINALYSIS AUTO W/O SCOPE: CPT | Performed by: FAMILY MEDICINE

## 2019-07-30 NOTE — PROGRESS NOTES
Patient presents with:  Abdominal Pain: x 3 days     HPI:   Kamille Grey is a 60year old female who presents to the office for abdominal pain. Seen by Dr. Basim Tineo for sinus infection 7/17/19. At that time, mentioned some abdominal pain.   She is feeli effective  Continue to keep appt right now. If closer to the time and symptoms improve can cancel  Careful with walking, standing too long.          Mary Hoffman M.D.   EMG 3  07/30/19

## 2019-08-22 ENCOUNTER — TELEPHONE (OUTPATIENT)
Dept: PHYSICAL THERAPY | Facility: HOSPITAL | Age: 59
End: 2019-08-22

## 2019-08-22 ENCOUNTER — OFFICE VISIT (OUTPATIENT)
Dept: FAMILY MEDICINE CLINIC | Facility: CLINIC | Age: 59
End: 2019-08-22
Payer: COMMERCIAL

## 2019-08-22 VITALS
TEMPERATURE: 98 F | WEIGHT: 216 LBS | HEART RATE: 70 BPM | BODY MASS INDEX: 31.99 KG/M2 | SYSTOLIC BLOOD PRESSURE: 110 MMHG | DIASTOLIC BLOOD PRESSURE: 60 MMHG | RESPIRATION RATE: 16 BRPM | HEIGHT: 69 IN

## 2019-08-22 DIAGNOSIS — E05.00 GRAVES' DISEASE: ICD-10-CM

## 2019-08-22 DIAGNOSIS — R73.03 PREDIABETES: ICD-10-CM

## 2019-08-22 DIAGNOSIS — I10 ESSENTIAL HYPERTENSION: ICD-10-CM

## 2019-08-22 DIAGNOSIS — Z00.00 ANNUAL PHYSICAL EXAM: Primary | ICD-10-CM

## 2019-08-22 PROCEDURE — 99396 PREV VISIT EST AGE 40-64: CPT | Performed by: FAMILY MEDICINE

## 2019-08-22 NOTE — TELEPHONE ENCOUNTER
Pt called back and requested to begin physical therapy after she sees Dr. Viviana French, urogynecologist.

## 2019-08-22 NOTE — PROGRESS NOTES
Zackary Uribe is a 60year old female who presents for a complete physical exam.   HPI:   Patient presents with:  Physical: WWE requesting pap, not due until 08-  Cough:  Follow up on cough, states it is not getting any better  Orders Call: Due T4F 1.4 07/17/2019 09:50 AM        Lab Results   Component Value Date/Time    CHOLEST 190 07/17/2019 09:50 AM    HDL 46 07/17/2019 09:50 AM    TRIG 96 07/17/2019 09:50 AM     (H) 07/17/2019 09:50 AM    NONHDLC 144 (H) 07/17/2019 09:50 AM       Lab R point review of systems was completed. Pertinent positives and negatives noted in the the HPI.   Specifically:  GEN:  No fever or fatigue  HEAD:  No headaches  EYES:  No vision change  EARS:  No hearing loss  MOUTH/THROAT:  No sore throat or dental problem Abdominal: Soft. Bowel sounds are normal. She exhibits no distension. There is no hepatosplenomegaly. There is no tenderness. There is no rebound and no guarding. No hernia. Musculoskeletal: Normal range of motion.    Lymphadenopathy:     She has no cer 12/22/2014 12:11 PM         Endocrine Medications          Levothyroxine Sodium 125 MCG Oral Tab                    Other    Prediabetes    Overview     A1c 6.0% 2019         Current Assessment & Plan     As for her Pre-Diabetes, it is well controlled, no

## 2019-08-23 ENCOUNTER — TELEPHONE (OUTPATIENT)
Dept: FAMILY MEDICINE CLINIC | Facility: CLINIC | Age: 59
End: 2019-08-23

## 2019-08-23 NOTE — TELEPHONE ENCOUNTER
patient called back she was given an inhaler sample by Dr Davidson Severe she started it today and now her heart is racing and she is concerned I told her the inhaler will make you heart rate increase she could not tell me how fast it was.  She will continue the [employer]

## 2019-08-23 NOTE — TELEPHONE ENCOUNTER
Patient was prescribed an inhaler yesterday, has noticed that her heart seems to be racing a little more than usual when she uses it.  Patient is unsure if that is normal.

## 2019-08-23 NOTE — ASSESSMENT & PLAN NOTE
Stable, Continue present management.     Thyroid  (most recent labs)   Lab Results   Component Value Date/Time    TSH 0.466 07/17/2019 09:50 AM    T4F 1.4 07/17/2019 09:50 AM    TSHT4 <0.01 (L) 12/22/2014 12:11 PM         Endocrine Medications          Levo

## 2019-08-26 DIAGNOSIS — I10 ESSENTIAL HYPERTENSION: ICD-10-CM

## 2019-08-26 RX ORDER — AMLODIPINE BESYLATE 5 MG/1
TABLET ORAL
Qty: 90 TABLET | Refills: 3 | Status: SHIPPED | OUTPATIENT
Start: 2019-08-26 | End: 2020-07-08

## 2019-08-26 NOTE — TELEPHONE ENCOUNTER
LOV:  8/22/2019  RTC: 1 YEAR     Future Appointments   Date Time Provider Carlos Eduardo Preciado   8/29/2019  3:00 PM [facility] AYAN RM1 [facility] MAMMO Vickie   9/3/2019  1:40 PM DO [address]   9/4/2019  3:15 PM Kait Yen, PT [facility] PHYS

## 2019-08-27 ENCOUNTER — APPOINTMENT (OUTPATIENT)
Dept: PHYSICAL THERAPY | Facility: HOSPITAL | Age: 59
End: 2019-08-27
Attending: FAMILY MEDICINE
Payer: COMMERCIAL

## 2019-08-28 ENCOUNTER — TELEPHONE (OUTPATIENT)
Dept: FAMILY MEDICINE CLINIC | Facility: CLINIC | Age: 59
End: 2019-08-28

## 2019-08-28 DIAGNOSIS — R05.9 COUGH: Primary | ICD-10-CM

## 2019-08-28 NOTE — TELEPHONE ENCOUNTER
Pt c/o continued cough x 2 months- Pt finished the antibiotic and using inhaler that was given in office. Pt not sure name of inhaler- was given a sample in office. Pt states cough not any better.  No difficulty breathing, no chest pains  Please advise- ro

## 2019-08-28 NOTE — TELEPHONE ENCOUNTER
Patient called states still has ongoing cough and inhaler is not working, wants to know if can get something else called in?  Patient did not want to schedule an appointment at this time

## 2019-08-29 ENCOUNTER — HOSPITAL ENCOUNTER (OUTPATIENT)
Dept: MAMMOGRAPHY | Age: 59
Discharge: HOME | End: 2019-08-29
Attending: FAMILY MEDICINE
Payer: COMMERCIAL

## 2019-08-29 DIAGNOSIS — Z12.31 VISIT FOR SCREENING MAMMOGRAM: ICD-10-CM

## 2019-08-29 PROCEDURE — 77063 BREAST TOMOSYNTHESIS BI: CPT | Performed by: FAMILY MEDICINE

## 2019-08-29 PROCEDURE — 77067 SCR MAMMO BI INCL CAD: CPT | Performed by: FAMILY MEDICINE

## 2019-08-30 ENCOUNTER — HOSPITAL ENCOUNTER (OUTPATIENT)
Dept: GENERAL RADIOLOGY | Age: 59
Discharge: HOME | End: 2019-08-30
Attending: FAMILY MEDICINE
Payer: COMMERCIAL

## 2019-08-30 DIAGNOSIS — R05.9 COUGH: ICD-10-CM

## 2019-08-30 PROCEDURE — 71046 X-RAY EXAM CHEST 2 VIEWS: CPT | Performed by: FAMILY MEDICINE

## 2019-09-03 ENCOUNTER — TELEPHONE (OUTPATIENT)
Dept: FAMILY MEDICINE CLINIC | Facility: CLINIC | Age: 59
End: 2019-09-03

## 2019-09-03 ENCOUNTER — OFFICE VISIT (OUTPATIENT)
Dept: UROLOGY | Facility: HOSPITAL | Age: 59
End: 2019-09-03
Attending: OBSTETRICS & GYNECOLOGY
Payer: COMMERCIAL

## 2019-09-03 VITALS — SYSTOLIC BLOOD PRESSURE: 118 MMHG | DIASTOLIC BLOOD PRESSURE: 68 MMHG

## 2019-09-03 DIAGNOSIS — N39.41 URGE INCONTINENCE: ICD-10-CM

## 2019-09-03 DIAGNOSIS — N95.2 POSTMENOPAUSAL ATROPHIC VAGINITIS: ICD-10-CM

## 2019-09-03 DIAGNOSIS — R10.2 PELVIC PAIN: ICD-10-CM

## 2019-09-03 DIAGNOSIS — R05.9 COUGH IN ADULT: Primary | ICD-10-CM

## 2019-09-03 DIAGNOSIS — N39.3 FEMALE STRESS INCONTINENCE: ICD-10-CM

## 2019-09-03 DIAGNOSIS — N81.84 PELVIC MUSCLE WASTING: Primary | ICD-10-CM

## 2019-09-03 LAB
BLOOD URINE: NEGATIVE
CONTROL RUN WITHIN 24 HOURS?: YES
LEUKOCYTE ESTERASE URINE: NEGATIVE
NITRITE URINE: NEGATIVE

## 2019-09-03 PROCEDURE — 87086 URINE CULTURE/COLONY COUNT: CPT | Performed by: OBSTETRICS & GYNECOLOGY

## 2019-09-03 PROCEDURE — 99201 HC OUTPT EVAL AND MGNT NEW PT LEVEL 1: CPT

## 2019-09-03 RX ORDER — ESTRADIOL 0.1 MG/G
CREAM VAGINAL
Qty: 1 TUBE | Refills: 0 | Status: SHIPPED | OUTPATIENT
Start: 2019-09-03 | End: 2020-03-03 | Stop reason: ALTCHOICE

## 2019-09-03 NOTE — PROGRESS NOTES
Edison Daniels,   9/3/2019     Referred by Dr. Asim Canales  Pt here with self    Patient presents with:  Pelvic Pain    She presents with c/o pelvic pain after walking - groin pain    HPI:  Some ADAM  Some UUI  Minimal bother by leakage  Nocturia x2  Denies pro Incontinence: Yes  Nocturia Frequency: 2  Frequency: 2 hours  Incomplete emptying: No  Constipation: No  Wears pad day?: 1  Activities are limited by UI/POP?: No  Currently Sexually Active: No    Review of Systems:    A comprehensive 12 point review of sys urinary symptoms. Discussed dietary & weight management with potential improvements in symptoms with weight loss.     Diagnostic Items:  urine culture    Medications Discussed:  Estrace Cream    Treatment Plan, Non-surgical:   RN teaching/pt education done

## 2019-09-03 NOTE — PATIENT INSTRUCTIONS
[facility]  [facility]    Fingertip Application Method for Estrogen Vaginal Cream    1. Wash you hands with soap and water and dry thoroughly.     2.  Squeeze out enough cream from the tube to cover 1/2 of your index fin

## 2019-09-04 ENCOUNTER — APPOINTMENT (OUTPATIENT)
Dept: PHYSICAL THERAPY | Facility: HOSPITAL | Age: 59
End: 2019-09-04
Attending: FAMILY MEDICINE
Payer: COMMERCIAL

## 2019-09-05 ENCOUNTER — TELEPHONE (OUTPATIENT)
Dept: UROLOGY | Facility: HOSPITAL | Age: 59
End: 2019-09-05

## 2019-09-05 NOTE — TELEPHONE ENCOUNTER
.Phoned patient, Cricket Fruit,  and informed of normal urine culture results. Patient to call with questions/concerns.

## 2019-09-09 ENCOUNTER — APPOINTMENT (OUTPATIENT)
Dept: PHYSICAL THERAPY | Facility: HOSPITAL | Age: 59
End: 2019-09-09
Attending: FAMILY MEDICINE
Payer: COMMERCIAL

## 2019-09-16 ENCOUNTER — APPOINTMENT (OUTPATIENT)
Dept: PHYSICAL THERAPY | Facility: HOSPITAL | Age: 59
End: 2019-09-16
Attending: FAMILY MEDICINE
Payer: COMMERCIAL

## 2019-09-23 ENCOUNTER — APPOINTMENT (OUTPATIENT)
Dept: PHYSICAL THERAPY | Facility: HOSPITAL | Age: 59
End: 2019-09-23
Attending: FAMILY MEDICINE
Payer: COMMERCIAL

## 2019-10-02 ENCOUNTER — APPOINTMENT (OUTPATIENT)
Dept: PHYSICAL THERAPY | Facility: HOSPITAL | Age: 59
End: 2019-10-02
Attending: FAMILY MEDICINE
Payer: COMMERCIAL

## 2019-10-19 DIAGNOSIS — E89.0 POSTABLATIVE HYPOTHYROIDISM: ICD-10-CM

## 2019-10-19 DIAGNOSIS — E55.9 VITAMIN D DEFICIENCY: ICD-10-CM

## 2019-10-21 RX ORDER — ERGOCALCIFEROL 1.25 MG/1
50000 CAPSULE ORAL
Qty: 6 CAPSULE | Refills: 3 | Status: SHIPPED | OUTPATIENT
Start: 2019-10-21 | End: 2020-05-21

## 2019-10-21 NOTE — TELEPHONE ENCOUNTER
LOV 5/22/19  Last Vit D level on 5/17/19 -- 27.68    Please advise on Vit D dose and will you authorize refill

## 2019-12-05 ENCOUNTER — OFFICE VISIT (OUTPATIENT)
Dept: PHYSICAL THERAPY | Age: 59
End: 2019-12-05
Attending: OBSTETRICS & GYNECOLOGY
Payer: COMMERCIAL

## 2019-12-05 DIAGNOSIS — N81.84 PELVIC MUSCLE WASTING: ICD-10-CM

## 2019-12-05 DIAGNOSIS — N39.41 URGE INCONTINENCE: ICD-10-CM

## 2019-12-05 DIAGNOSIS — R10.2 PELVIC PAIN: ICD-10-CM

## 2019-12-05 PROCEDURE — 97161 PT EVAL LOW COMPLEX 20 MIN: CPT

## 2019-12-07 NOTE — PROGRESS NOTES
MUSCULOSKELETAL AND PELVIC FLOOR EVALUATION:   Referring Physician: Dr. Fiona Jauregui  Diagnosis: pelvic pain Date of Service: 12/6/2019     PATIENT SUMMARY   Zackary Uribe is a 60year old female  who presents to therapy today with complaints of pelvi diagnosis of  Tight hip flexor muscles and possible PF tightness/tone. Pt and PT discussed evaluation findings, pathology, POC and HEP. Pt voiced understanding and performs HEP correctly without reported pain.  Skilled Pelvic Physical Therapy is medically OF CARE:    Goals: (to be met in  10  visits)  1.complete the internal exam next appt  2. Pt to report no pain with her 5 mile walks for exercise  3. Increase lumbar spine flexion to 75 degs pain free coming back up to standing  4.  Lumbar ext to 30 degs fo

## 2019-12-12 ENCOUNTER — OFFICE VISIT (OUTPATIENT)
Dept: PHYSICAL THERAPY | Age: 59
End: 2019-12-12
Attending: OBSTETRICS & GYNECOLOGY
Payer: COMMERCIAL

## 2019-12-12 DIAGNOSIS — R10.2 PELVIC PAIN: ICD-10-CM

## 2019-12-12 DIAGNOSIS — N39.41 URGE INCONTINENCE: ICD-10-CM

## 2019-12-12 DIAGNOSIS — N81.84 PELVIC MUSCLE WASTING: ICD-10-CM

## 2019-12-12 PROCEDURE — 97110 THERAPEUTIC EXERCISES: CPT

## 2019-12-13 NOTE — PROGRESS NOTES
Dx:   Pelvic pain / low back pain       Insurance (Authorized # of Visits):  10           Authorizing Phy sician: Dr. Kev Bennett  Next MD visit: none scheduled  Fall Risk: standard         Precautions: n/a             Subjective:  Pt has been performing her min

## 2019-12-17 ENCOUNTER — APPOINTMENT (OUTPATIENT)
Dept: PHYSICAL THERAPY | Age: 59
End: 2019-12-17
Attending: OBSTETRICS & GYNECOLOGY
Payer: COMMERCIAL

## 2019-12-19 ENCOUNTER — OFFICE VISIT (OUTPATIENT)
Dept: PHYSICAL THERAPY | Age: 59
End: 2019-12-19
Attending: OBSTETRICS & GYNECOLOGY
Payer: COMMERCIAL

## 2019-12-19 DIAGNOSIS — N39.41 URGE INCONTINENCE: ICD-10-CM

## 2019-12-19 DIAGNOSIS — R10.2 PELVIC PAIN: ICD-10-CM

## 2019-12-19 DIAGNOSIS — N81.84 PELVIC MUSCLE WASTING: ICD-10-CM

## 2019-12-19 PROCEDURE — 97110 THERAPEUTIC EXERCISES: CPT

## 2019-12-19 NOTE — PROGRESS NOTES
Dx:   Pelvic pain / low back pain       Insurance (Authorized # of Visits):  10           Authorizing Phy sician: Dr. Pal Edwards  Next MD visit: none scheduled  Fall Risk: standard         Precautions: n/a             Subjective:    Pt is feeling better, I

## 2019-12-26 ENCOUNTER — OFFICE VISIT (OUTPATIENT)
Dept: PHYSICAL THERAPY | Age: 59
End: 2019-12-26
Attending: OBSTETRICS & GYNECOLOGY
Payer: COMMERCIAL

## 2019-12-26 DIAGNOSIS — N39.41 URGE INCONTINENCE: ICD-10-CM

## 2019-12-26 DIAGNOSIS — N81.84 PELVIC MUSCLE WASTING: ICD-10-CM

## 2019-12-26 DIAGNOSIS — R10.2 PELVIC PAIN: ICD-10-CM

## 2019-12-26 PROCEDURE — 97110 THERAPEUTIC EXERCISES: CPT

## 2019-12-27 NOTE — PROGRESS NOTES
Dx:   Pelvic pain / low back pain       Insurance (Authorized # of Visits):  10           Authorizing Phy sician: Dr. Beronica Vanessa  Next MD visit: none scheduled  Fall Risk: standard         Precautions: n/a             Subjective:    I walked my 3 miles but mins  Across arm shoulder stretch 3 x 30 secs         MHP in sitting 10 mins              HEP:  Cont with stretches from last appt.  Issued  10 sec hold and 10 fast contractions in 10 secs 10 reps each     Charges: EX 3        Total Timed Treatment: 45 min

## 2020-01-21 ENCOUNTER — TELEPHONE (OUTPATIENT)
Dept: FAMILY MEDICINE CLINIC | Facility: CLINIC | Age: 60
End: 2020-01-21

## 2020-01-21 RX ORDER — ALBUTEROL SULFATE 90 UG/1
2 AEROSOL, METERED RESPIRATORY (INHALATION) EVERY 6 HOURS PRN
Qty: 1 INHALER | Refills: 2 | Status: SHIPPED | OUTPATIENT
Start: 2020-01-21

## 2020-01-21 NOTE — TELEPHONE ENCOUNTER
Patient is calling stating she has been dealing with a cough for 3weeks now. No fever only cough. She would like to know if Dr. April De La Torre can prescribe her an inhaler.  Patient stated he had prescribed an inhaler for her in the past for cough which worked well f

## 2020-01-21 NOTE — TELEPHONE ENCOUNTER
Called and talked to patient she was given an inhaler sample for a cough she had in the past would like that sent in for her if possible. I will ask Dr Conrado Calvillo about this?

## 2020-01-21 NOTE — TELEPHONE ENCOUNTER
Please notify:    Requested Prescriptions     Signed Prescriptions Disp Refills   • Albuterol Sulfate  (90 Base) MCG/ACT Inhalation Aero Soln 1 Inhaler 2     Sig: Inhale 2 puffs into the lungs every 6 (six) hours as needed for Wheezing.      Payam Jose

## 2020-02-10 ENCOUNTER — TELEPHONE (OUTPATIENT)
Dept: FAMILY MEDICINE CLINIC | Facility: CLINIC | Age: 60
End: 2020-02-10

## 2020-02-10 ENCOUNTER — TELEPHONE (OUTPATIENT)
Dept: UROLOGY | Facility: HOSPITAL | Age: 60
End: 2020-02-10

## 2020-02-10 NOTE — TELEPHONE ENCOUNTER
Pain is getting worse in pelvic area has tried calling the GYN but they are not calling back.  She is wondering if she could get something for the pain until she can be seen by the specialist. She has called them several times without response

## 2020-02-10 NOTE — TELEPHONE ENCOUNTER
Patient notified. I gave her two additional phone numbers to try and reach Dr Alpesh Kaufman office. She will call these numbers now.

## 2020-02-10 NOTE — TELEPHONE ENCOUNTER
I don't think I would be the right person to manage this pain.   Keep trying to get in touch with the specialist.

## 2020-02-10 NOTE — TELEPHONE ENCOUNTER
Pt states she is having private area pain and is trying to contact her Uro/Gyne but they are not calling her back. Taking Tylenol but not helping. What to do?

## 2020-02-10 NOTE — TELEPHONE ENCOUNTER
Pt calling today w/ c/o pelvic pain when walking. Pt has upcoming appt 3/2 w/ Dr Brandie Truong, which pt remarked she's going to cx. Pt wanting to know \"what can I do for the pain? \" Looking back at Dr Nguyen Grade and PT notes, appears pt stopped going after 4 sessio

## 2020-02-24 ENCOUNTER — TELEPHONE (OUTPATIENT)
Dept: FAMILY MEDICINE CLINIC | Facility: CLINIC | Age: 60
End: 2020-02-24

## 2020-02-24 NOTE — TELEPHONE ENCOUNTER
patient had headache and BP was elevated Friday Saturday and Sunday she is ok now BP is normal now so I had her keep a record of this and she will bring it in at her next appointment

## 2020-02-24 NOTE — TELEPHONE ENCOUNTER
Patient requesting to speak to nurse to find out if she should come in. Patient stated bp was elevated on Friday with headaches. Had bp checked at [facility] and bp was 150/80. No headaches today has not checked bp.  Patient wants to know if she should recheck b

## 2020-03-03 ENCOUNTER — OFFICE VISIT (OUTPATIENT)
Dept: UROLOGY | Facility: HOSPITAL | Age: 60
End: 2020-03-03
Attending: OBSTETRICS & GYNECOLOGY
Payer: COMMERCIAL

## 2020-03-03 VITALS
WEIGHT: 216 LBS | BODY MASS INDEX: 31.99 KG/M2 | DIASTOLIC BLOOD PRESSURE: 85 MMHG | HEIGHT: 69 IN | SYSTOLIC BLOOD PRESSURE: 146 MMHG

## 2020-03-03 DIAGNOSIS — N39.41 URGE INCONTINENCE: ICD-10-CM

## 2020-03-03 DIAGNOSIS — N39.3 FEMALE STRESS INCONTINENCE: Primary | ICD-10-CM

## 2020-03-03 DIAGNOSIS — N95.2 POSTMENOPAUSAL ATROPHIC VAGINITIS: ICD-10-CM

## 2020-03-03 DIAGNOSIS — N81.84 PELVIC MUSCLE WASTING: ICD-10-CM

## 2020-03-03 DIAGNOSIS — R10.2 PELVIC PAIN: ICD-10-CM

## 2020-03-03 LAB
BILIRUB UR QL STRIP.AUTO: NEGATIVE
CLARITY UR REFRACT.AUTO: CLEAR
COLOR UR AUTO: YELLOW
CONTROL RUN WITHIN 24 HOURS?: YES
GLUCOSE UR STRIP.AUTO-MCNC: NEGATIVE MG/DL
KETONES UR STRIP.AUTO-MCNC: NEGATIVE MG/DL
LEUKOCYTE ESTERASE UR QL STRIP.AUTO: NEGATIVE
LEUKOCYTE ESTERASE URINE: NEGATIVE
NITRITE UR QL STRIP.AUTO: NEGATIVE
NITRITE URINE: NEGATIVE
PH UR STRIP.AUTO: 6 [PH] (ref 4.5–8)
PROT UR STRIP.AUTO-MCNC: NEGATIVE MG/DL
RBC UR QL AUTO: NEGATIVE
SP GR UR STRIP.AUTO: 1.02 (ref 1–1.03)
UROBILINOGEN UR STRIP.AUTO-MCNC: 2 MG/DL

## 2020-03-03 PROCEDURE — 99212 OFFICE O/P EST SF 10 MIN: CPT

## 2020-03-03 PROCEDURE — 87086 URINE CULTURE/COLONY COUNT: CPT | Performed by: OBSTETRICS & GYNECOLOGY

## 2020-03-03 PROCEDURE — 81001 URINALYSIS AUTO W/SCOPE: CPT | Performed by: OBSTETRICS & GYNECOLOGY

## 2020-03-03 RX ORDER — ESTRADIOL 0.1 MG/G
CREAM VAGINAL
Qty: 1 TUBE | Refills: 3 | Status: SHIPPED | OUTPATIENT
Start: 2020-03-03

## 2020-03-03 NOTE — PATIENT INSTRUCTIONS
[facility]  [facility]    Fingertip Application Method for Estrogen Vaginal Cream    1. Wash you hands with soap and water and dry thoroughly.     2.  Squeeze out enough cream from the tube to cover 1/2 of your index fin

## 2020-03-03 NOTE — PROGRESS NOTES
Patient presents to follow up pelvic pain, UI    She is currently using pelvic floor PT    She reports some improvement   UI stable  Lower abd, vag pain persists  Painful to sit for long periods of time  Not using Vag estrogen    Reports recent knee swelli

## 2020-03-04 ENCOUNTER — TELEPHONE (OUTPATIENT)
Dept: FAMILY MEDICINE CLINIC | Facility: CLINIC | Age: 60
End: 2020-03-04

## 2020-03-04 ENCOUNTER — OFFICE VISIT (OUTPATIENT)
Dept: FAMILY MEDICINE CLINIC | Facility: CLINIC | Age: 60
End: 2020-03-04
Payer: COMMERCIAL

## 2020-03-04 VITALS
RESPIRATION RATE: 16 BRPM | WEIGHT: 222 LBS | SYSTOLIC BLOOD PRESSURE: 118 MMHG | HEIGHT: 69 IN | BODY MASS INDEX: 32.88 KG/M2 | TEMPERATURE: 97 F | HEART RATE: 80 BPM | DIASTOLIC BLOOD PRESSURE: 70 MMHG

## 2020-03-04 DIAGNOSIS — Z12.31 VISIT FOR SCREENING MAMMOGRAM: ICD-10-CM

## 2020-03-04 DIAGNOSIS — R06.02 SOB (SHORTNESS OF BREATH): ICD-10-CM

## 2020-03-04 DIAGNOSIS — M25.561 CHRONIC PAIN OF BOTH KNEES: ICD-10-CM

## 2020-03-04 DIAGNOSIS — Z00.00 LABORATORY EXAMINATION ORDERED AS PART OF A ROUTINE GENERAL MEDICAL EXAMINATION: ICD-10-CM

## 2020-03-04 DIAGNOSIS — E05.00 GRAVES' DISEASE: Primary | ICD-10-CM

## 2020-03-04 DIAGNOSIS — M25.562 CHRONIC PAIN OF BOTH KNEES: ICD-10-CM

## 2020-03-04 DIAGNOSIS — G89.29 CHRONIC PAIN OF BOTH KNEES: ICD-10-CM

## 2020-03-04 PROCEDURE — 99214 OFFICE O/P EST MOD 30 MIN: CPT | Performed by: FAMILY MEDICINE

## 2020-03-04 NOTE — TELEPHONE ENCOUNTER
Patient is asking for the order her stress test.  She said she discussed it with Dr. Karla Leyden at her appt today. Please enter.   Please call patient when entered

## 2020-03-04 NOTE — PROGRESS NOTES
Patient presents with:  Blood Pressure  Knee Pain: R knee pain and swelling x 2 weeks      Subjective   HPI:   This is a 60year old female coming in for follow up HTN, doing well on meds. Adjusting recently. RIght knee, not in last 5+ years.  Also, hx inje negative      Left Knee Exam   Left knee exam is normal.    Muscle Strength   The patient has normal left knee strength. Range of Motion   The patient has normal left knee ROM.   Extension: normal   Flexion: normal     Tests   Tony:  Medial - negativ

## 2020-03-06 ENCOUNTER — HOSPITAL ENCOUNTER (OUTPATIENT)
Dept: GENERAL RADIOLOGY | Age: 60
Discharge: HOME | End: 2020-03-06
Attending: FAMILY MEDICINE
Payer: COMMERCIAL

## 2020-03-06 ENCOUNTER — HOSPITAL ENCOUNTER (OUTPATIENT)
Dept: ULTRASOUND IMAGING | Age: 60
Discharge: HOME | End: 2020-03-06
Attending: OBSTETRICS & GYNECOLOGY
Payer: COMMERCIAL

## 2020-03-06 DIAGNOSIS — R10.2 PELVIC PAIN: ICD-10-CM

## 2020-03-06 DIAGNOSIS — M25.562 CHRONIC PAIN OF BOTH KNEES: ICD-10-CM

## 2020-03-06 DIAGNOSIS — M25.561 CHRONIC PAIN OF BOTH KNEES: ICD-10-CM

## 2020-03-06 DIAGNOSIS — G89.29 CHRONIC PAIN OF BOTH KNEES: ICD-10-CM

## 2020-03-06 PROCEDURE — 76830 TRANSVAGINAL US NON-OB: CPT | Performed by: OBSTETRICS & GYNECOLOGY

## 2020-03-06 PROCEDURE — 76856 US EXAM PELVIC COMPLETE: CPT | Performed by: OBSTETRICS & GYNECOLOGY

## 2020-03-06 PROCEDURE — 73560 X-RAY EXAM OF KNEE 1 OR 2: CPT | Performed by: FAMILY MEDICINE

## 2020-03-09 ENCOUNTER — TELEPHONE (OUTPATIENT)
Dept: UROLOGY | Facility: HOSPITAL | Age: 60
End: 2020-03-09

## 2020-03-09 NOTE — TELEPHONE ENCOUNTER
Dr. Pal Edwards reviewed the pelvic ultrasound results, WNL, no masses, continue pelvic floor physical therapy as orall questions answereddered, called patient with results, verbalizing understanding, pt agree's to plan

## 2020-03-13 ENCOUNTER — HOSPITAL ENCOUNTER (OUTPATIENT)
Dept: CV DIAGNOSTICS | Facility: HOSPITAL | Age: 60
Discharge: HOME | End: 2020-03-13
Attending: FAMILY MEDICINE
Payer: COMMERCIAL

## 2020-03-13 DIAGNOSIS — R06.02 SOB (SHORTNESS OF BREATH): ICD-10-CM

## 2020-03-13 PROCEDURE — 93017 CV STRESS TEST TRACING ONLY: CPT | Performed by: FAMILY MEDICINE

## 2020-03-13 PROCEDURE — 93018 CV STRESS TEST I&R ONLY: CPT | Performed by: FAMILY MEDICINE

## 2020-04-07 ENCOUNTER — TELEPHONE (OUTPATIENT)
Dept: FAMILY MEDICINE CLINIC | Facility: CLINIC | Age: 60
End: 2020-04-07

## 2020-04-07 DIAGNOSIS — R05.3 CHRONIC COUGHING: Primary | ICD-10-CM

## 2020-04-07 PROCEDURE — 99213 OFFICE O/P EST LOW 20 MIN: CPT | Performed by: FAMILY MEDICINE

## 2020-04-07 RX ORDER — OMEPRAZOLE 40 MG/1
40 CAPSULE, DELAYED RELEASE ORAL DAILY
Qty: 90 CAPSULE | Refills: 0 | Status: SHIPPED | OUTPATIENT
Start: 2020-04-07 | End: 2020-06-29

## 2020-04-07 NOTE — TELEPHONE ENCOUNTER
2Virtual/Telephone Check-In    Dino Henderson verbally consents to a Virtual/Telephone Check-In service on 04/07/20. Patient understands and accepts financial responsibility for any deductible, co-insurance and/or co-pays associated with this service.

## 2020-04-07 NOTE — TELEPHONE ENCOUNTER
Called and talked to patient she has had this cough from Jan using inhaler BID now takes zyrtec at night. Cough feel moist but not productive except in the morning. Speaking in full sentences without difficulty. Denies fever  Or SOB.  She is just concerned

## 2020-04-07 NOTE — TELEPHONE ENCOUNTER
Patient was given an inhaler by Dr. Nayeli Ramos for a cough, patient states it has not gone away, she has no fever, no other symptoms, wondering if she should be concerned

## 2020-05-22 NOTE — LETTER
November 29, 2017    Patient: Nonda Bolus   Date of Visit: 11/29/2017       To Whom It May Concern:    Brennan Dacosta was seen and treated in our emergency department on 11/29/2017. She should not return to work until 12/1/17.     If you have a General: denies fever, chills  HENT: denies nasal congestion, sore throat, rhinorrhea  Eyes: denies vision changes  CV: denies chest pain  Resp: denies difficulty breathing, cough  Abdominal: denies nausea, vomiting, diarrhea, abdominal pain, blood in stool, dark stool  : denies pain with urination  MSK: +recent trauma  Neuro: denies headaches, numbness, tingling, dizziness, lightheadedness.  Skin: denies new rashes  Endocrine: denies recent weight loss

## 2020-06-29 DIAGNOSIS — R05.3 CHRONIC COUGHING: ICD-10-CM

## 2020-06-29 RX ORDER — OMEPRAZOLE 40 MG/1
CAPSULE, DELAYED RELEASE ORAL
Qty: 90 CAPSULE | Refills: 0 | Status: SHIPPED | OUTPATIENT
Start: 2020-06-29 | End: 2020-10-01

## 2020-06-29 NOTE — TELEPHONE ENCOUNTER
Virtual Phone visit w/Jonny on 4/7/2020 and started PPI due to chronic coughing  Annual Physical scheduled with Jonny on 8/22/20  Omeprazole PENDING

## 2020-06-29 NOTE — TELEPHONE ENCOUNTER
OMEPRAZOLE DR 40 MG CAPSULE          Will file in chart as: OMEPRAZOLE 40 MG Oral Capsule Delayed Release         Sig: TAKE 1 CAPSULE BY MOUTH EVERY DAY    Disp:  90 capsule    Refills:  0

## 2020-07-07 ENCOUNTER — TELEPHONE (OUTPATIENT)
Dept: FAMILY MEDICINE CLINIC | Facility: CLINIC | Age: 60
End: 2020-07-07

## 2020-07-07 DIAGNOSIS — I10 ESSENTIAL HYPERTENSION: ICD-10-CM

## 2020-07-07 NOTE — TELEPHONE ENCOUNTER
Patient states she only has 2-3 more days of omeprazole left. There has been some improvement, but she is still coughing.  She is asking whether she should continue Omeprazole beyond this point or whether it is time to f/u with a pulmonologist?    Routed to

## 2020-07-08 RX ORDER — AMLODIPINE BESYLATE 5 MG/1
TABLET ORAL
Qty: 90 TABLET | Refills: 3 | Status: SHIPPED | OUTPATIENT
Start: 2020-07-08 | End: 2021-07-16

## 2020-07-08 NOTE — TELEPHONE ENCOUNTER
Requested Prescriptions     Pending Prescriptions Disp Refills   • AMLODIPINE BESYLATE 5 MG Oral Tab [Pharmacy Med Name: AMLODIPINE BESYLATE 5 MG TAB] 90 tablet 3     Sig: TAKE 1 TABLET BY MOUTH EVERY DAY     LOV 3/4/2020     Patient was asked to follow-up

## 2020-07-08 NOTE — TELEPHONE ENCOUNTER
Spoke to patient and she would like to know why she needs to come in to be seen for this. She feels like it is now time to see a specialist since she has been dealing with a cough since January.  Patient has an appointment scheduled for her physical in Augu

## 2020-07-08 NOTE — TELEPHONE ENCOUNTER
LOV was 3/4.  She should be seen by one of us, or this should be rounted to whoever just saw her if she was seen

## 2020-07-10 ENCOUNTER — VIRTUAL PHONE E/M (OUTPATIENT)
Dept: FAMILY MEDICINE CLINIC | Facility: CLINIC | Age: 60
End: 2020-07-10
Payer: COMMERCIAL

## 2020-07-10 DIAGNOSIS — R05.3 CHRONIC COUGH: Primary | ICD-10-CM

## 2020-07-10 DIAGNOSIS — R73.03 PREDIABETES: ICD-10-CM

## 2020-07-10 PROCEDURE — 99213 OFFICE O/P EST LOW 20 MIN: CPT | Performed by: FAMILY MEDICINE

## 2020-07-10 RX ORDER — FLUTICASONE PROPIONATE AND SALMETEROL 100; 50 UG/1; UG/1
1 POWDER RESPIRATORY (INHALATION) 2 TIMES DAILY
Qty: 1 PACKAGE | Refills: 5 | Status: SHIPPED | OUTPATIENT
Start: 2020-07-10 | End: 2021-01-12

## 2020-07-10 NOTE — PROGRESS NOTES
Patient presents with:  Cough: For years    Virtual/Telephone Check-In    Corine Pinzon verbally consents to a Virtual/Telephone Check-In service on 07/10/20.   Patient has been referred to the [facility] website at [email or web address] to review the yea noted. Recommendations are: continue present meds, lose weight by increased dietary compliance and exercise and will check labs as ordered.     Lab Results   Component Value Date    A1C 6.0 (H) 07/17/2019    A1C 6.0 (H) 05/17/2019   Labs due soon

## 2020-08-14 ENCOUNTER — APPOINTMENT (OUTPATIENT)
Dept: LAB | Age: 60
End: 2020-08-14
Attending: FAMILY MEDICINE
Payer: COMMERCIAL

## 2020-08-14 DIAGNOSIS — E05.00 GRAVES' DISEASE: ICD-10-CM

## 2020-08-14 DIAGNOSIS — E55.9 VITAMIN D DEFICIENCY: ICD-10-CM

## 2020-08-14 DIAGNOSIS — E89.0 POSTABLATIVE HYPOTHYROIDISM: ICD-10-CM

## 2020-08-14 DIAGNOSIS — Z00.00 LABORATORY EXAMINATION ORDERED AS PART OF A ROUTINE GENERAL MEDICAL EXAMINATION: ICD-10-CM

## 2020-08-14 DIAGNOSIS — R73.03 PREDIABETES: ICD-10-CM

## 2020-08-14 LAB
ALBUMIN SERPL-MCNC: 3.9 G/DL (ref 3.4–5)
ALBUMIN/GLOB SERPL: 1 {RATIO} (ref 1–2)
ALP LIVER SERPL-CCNC: 82 U/L (ref 46–118)
ALT SERPL-CCNC: 14 U/L (ref 13–56)
ANION GAP SERPL CALC-SCNC: 2 MMOL/L (ref 0–18)
AST SERPL-CCNC: 12 U/L (ref 15–37)
BILIRUB SERPL-MCNC: 0.6 MG/DL (ref 0.1–2)
BUN BLD-MCNC: 9 MG/DL (ref 7–18)
BUN/CREAT SERPL: 7.8 (ref 10–20)
CALCIUM BLD-MCNC: 9.2 MG/DL (ref 8.5–10.1)
CHLORIDE SERPL-SCNC: 108 MMOL/L (ref 98–112)
CHOLEST SMN-MCNC: 203 MG/DL (ref ?–200)
CO2 SERPL-SCNC: 29 MMOL/L (ref 21–32)
CREAT BLD-MCNC: 1.15 MG/DL (ref 0.55–1.02)
DEPRECATED RDW RBC AUTO: 47.8 FL (ref 35.1–46.3)
ERYTHROCYTE [DISTWIDTH] IN BLOOD BY AUTOMATED COUNT: 14.2 % (ref 11–15)
EST. AVERAGE GLUCOSE BLD GHB EST-MCNC: 126 MG/DL (ref 68–126)
GLOBULIN PLAS-MCNC: 4 G/DL (ref 2.8–4.4)
GLUCOSE BLD-MCNC: 96 MG/DL (ref 70–99)
HBA1C MFR BLD HPLC: 6 % (ref ?–5.7)
HCT VFR BLD AUTO: 38.1 % (ref 35–48)
HDLC SERPL-MCNC: 51 MG/DL (ref 40–59)
HGB BLD-MCNC: 11.6 G/DL (ref 12–16)
LDLC SERPL CALC-MCNC: 134 MG/DL (ref ?–100)
M PROTEIN MFR SERPL ELPH: 7.9 G/DL (ref 6.4–8.2)
MCH RBC QN AUTO: 28.2 PG (ref 26–34)
MCHC RBC AUTO-ENTMCNC: 30.4 G/DL (ref 31–37)
MCV RBC AUTO: 92.7 FL (ref 80–100)
NONHDLC SERPL-MCNC: 152 MG/DL (ref ?–130)
OSMOLALITY SERPL CALC.SUM OF ELEC: 287 MOSM/KG (ref 275–295)
PATIENT FASTING Y/N/NP: YES
PATIENT FASTING Y/N/NP: YES
PLATELET # BLD AUTO: 248 10(3)UL (ref 150–450)
POTASSIUM SERPL-SCNC: 4 MMOL/L (ref 3.5–5.1)
RBC # BLD AUTO: 4.11 X10(6)UL (ref 3.8–5.3)
SODIUM SERPL-SCNC: 139 MMOL/L (ref 136–145)
T4 FREE SERPL-MCNC: 1.3 NG/DL (ref 0.8–1.7)
TRIGL SERPL-MCNC: 90 MG/DL (ref 30–149)
TSI SER-ACNC: 1.62 MIU/ML (ref 0.36–3.74)
VIT D+METAB SERPL-MCNC: 38.1 NG/ML (ref 30–100)
VLDLC SERPL CALC-MCNC: 18 MG/DL (ref 0–30)
WBC # BLD AUTO: 6 X10(3) UL (ref 4–11)

## 2020-08-14 PROCEDURE — 83036 HEMOGLOBIN GLYCOSYLATED A1C: CPT

## 2020-08-14 PROCEDURE — 85027 COMPLETE CBC AUTOMATED: CPT

## 2020-08-14 PROCEDURE — 82306 VITAMIN D 25 HYDROXY: CPT

## 2020-08-14 PROCEDURE — 84439 ASSAY OF FREE THYROXINE: CPT

## 2020-08-14 PROCEDURE — 84443 ASSAY THYROID STIM HORMONE: CPT

## 2020-08-14 PROCEDURE — 80061 LIPID PANEL: CPT

## 2020-08-14 PROCEDURE — 80053 COMPREHEN METABOLIC PANEL: CPT

## 2020-08-17 ENCOUNTER — TELEPHONE (OUTPATIENT)
Dept: UROLOGY | Facility: HOSPITAL | Age: 60
End: 2020-08-17

## 2020-08-17 NOTE — TELEPHONE ENCOUNTER
Spoke to patient today regarding upcoming appointment with Dr Robert Patino for PT f/u. Patient states did not go to PFPT feels doing well walking 3 miles a day, thinks not wearing high heels might help.   Has not been using estrace in the last month, reeducat

## 2020-08-21 NOTE — ASSESSMENT & PLAN NOTE
Stable, Continue present management.     Thyroid  (most recent labs)   Lab Results   Component Value Date/Time    TSH 1.620 08/14/2020 09:56 AM    T4F 1.3 08/14/2020 09:56 AM    TSHT4 <0.01 (L) 12/22/2014 12:11 PM         Endocrine Medications          Levo

## 2020-08-22 ENCOUNTER — OFFICE VISIT (OUTPATIENT)
Dept: FAMILY MEDICINE CLINIC | Facility: CLINIC | Age: 60
End: 2020-08-22
Payer: COMMERCIAL

## 2020-08-22 VITALS
HEART RATE: 72 BPM | RESPIRATION RATE: 20 BRPM | TEMPERATURE: 98 F | DIASTOLIC BLOOD PRESSURE: 68 MMHG | SYSTOLIC BLOOD PRESSURE: 114 MMHG

## 2020-08-22 DIAGNOSIS — R73.03 PREDIABETES: ICD-10-CM

## 2020-08-22 DIAGNOSIS — Z12.11 SCREEN FOR COLON CANCER: ICD-10-CM

## 2020-08-22 DIAGNOSIS — I10 ESSENTIAL HYPERTENSION: ICD-10-CM

## 2020-08-22 DIAGNOSIS — E05.00 GRAVES' DISEASE: ICD-10-CM

## 2020-08-22 DIAGNOSIS — Z00.00 ANNUAL PHYSICAL EXAM: Primary | ICD-10-CM

## 2020-08-22 PROCEDURE — 3074F SYST BP LT 130 MM HG: CPT | Performed by: FAMILY MEDICINE

## 2020-08-22 PROCEDURE — 3078F DIAST BP <80 MM HG: CPT | Performed by: FAMILY MEDICINE

## 2020-08-22 PROCEDURE — 3008F BODY MASS INDEX DOCD: CPT | Performed by: FAMILY MEDICINE

## 2020-08-22 PROCEDURE — 99396 PREV VISIT EST AGE 40-64: CPT | Performed by: FAMILY MEDICINE

## 2020-08-22 NOTE — PROGRESS NOTES
Anaid Rubi is a 60year old female who presents for a complete physical exam.   HPI:   Patient presents with:  Physical  Testing: due for colonoscopy     Her last annual assessment has been over 1 year: Annual Physical due on 08/22/2020          [facility] Results   Component Value Date/Time    CHOLEST 203 (H) 08/14/2020 09:56 AM    HDL 51 08/14/2020 09:56 AM    TRIG 90 08/14/2020 09:56 AM     (H) 08/14/2020 09:56 AM    NONHDLC 152 (H) 08/14/2020 09:56 AM       Lab Results   Component Value Date/Time [penicillins]; and augmentin [amoclan]. She  reports that she has never smoked. She has never used smokeless tobacco. She reports that she does not drink alcohol or use drugs. Exercise:  twice per week.   Diet: watches fats closely    GYNE HISTORY: Men thyromegaly present. Cardiovascular: Normal rate, regular rhythm, S1 normal, S2 normal, normal heart sounds and intact distal pulses. Exam reveals no gallop, no S3, no S4 and no friction rub. No murmur heard. Edema not present. Carotid bruit not pre Stable, Continue present management.     Blood Pressure and Cardiac Medications          AMLODIPINE BESYLATE 5 MG Oral Tab                    Endocrine    Graves' disease    Overview     Managed by Dr Analy Smalls, Dx 1.2015, on levothyroxine 100         Current

## 2020-08-31 ENCOUNTER — HOSPITAL ENCOUNTER (OUTPATIENT)
Dept: MAMMOGRAPHY | Age: 60
Discharge: HOME | End: 2020-08-31
Attending: FAMILY MEDICINE
Payer: COMMERCIAL

## 2020-08-31 DIAGNOSIS — Z12.31 VISIT FOR SCREENING MAMMOGRAM: ICD-10-CM

## 2020-08-31 PROCEDURE — 77067 SCR MAMMO BI INCL CAD: CPT | Performed by: FAMILY MEDICINE

## 2020-08-31 PROCEDURE — 77063 BREAST TOMOSYNTHESIS BI: CPT | Performed by: FAMILY MEDICINE

## 2020-09-28 DIAGNOSIS — R05.3 CHRONIC COUGHING: ICD-10-CM

## 2020-10-01 RX ORDER — OMEPRAZOLE 40 MG/1
CAPSULE, DELAYED RELEASE ORAL
Qty: 90 CAPSULE | Refills: 0 | Status: SHIPPED | OUTPATIENT
Start: 2020-10-01 | End: 2021-01-04

## 2020-10-01 NOTE — TELEPHONE ENCOUNTER
OMEPRAZOLE DR 40 MG CAPSULE     Sig: TAKE 1 CAPSULE BY MOUTH EVERY DAY    Disp:  90 capsule    Refills:  0    Start: 9/28/2020    Class: Normal    Non-formulary For: Chronic coughing    Last ordered: 3 months ago by Carolee Araujo MD Last refill: 6/29/2020

## 2020-10-01 NOTE — TELEPHONE ENCOUNTER
LOV with Dr Bradnon Flannery for physical on 9/28/2020, Omeprazole refilled at that time for #90  Upcoming visit schedule for 2/25/21  Forward to Dr Danny Mon for refill consideration

## 2020-10-05 ENCOUNTER — TELEPHONE (OUTPATIENT)
Dept: FAMILY MEDICINE CLINIC | Facility: CLINIC | Age: 60
End: 2020-10-05

## 2020-10-05 NOTE — TELEPHONE ENCOUNTER
Received records request from [place] requesting last 2 office notes & recent blood work to be faxed to [facility].  All records in [address] [address], printed office notes from last two office visits & lab work, faxed to Salena Encinas [phone]
stretcher

## 2020-12-01 ENCOUNTER — APPOINTMENT (OUTPATIENT)
Dept: LAB | Age: 60
End: 2020-12-01
Attending: INTERNAL MEDICINE
Payer: COMMERCIAL

## 2020-12-01 DIAGNOSIS — Z01.818 PRE-OP TESTING: ICD-10-CM

## 2020-12-04 PROBLEM — R12 CHRONIC HEARTBURN: Status: ACTIVE | Noted: 2020-12-04

## 2020-12-04 PROBLEM — Z86.0100 PERSONAL HISTORY OF COLONIC POLYPS: Status: ACTIVE | Noted: 2020-12-04

## 2020-12-04 PROBLEM — D12.5 BENIGN NEOPLASM OF SIGMOID COLON: Status: ACTIVE | Noted: 2020-12-04

## 2020-12-04 PROBLEM — Z86.010 PERSONAL HISTORY OF COLONIC POLYPS: Status: ACTIVE | Noted: 2020-12-04

## 2021-01-03 DIAGNOSIS — R05.3 CHRONIC COUGHING: ICD-10-CM

## 2021-01-04 RX ORDER — OMEPRAZOLE 40 MG/1
CAPSULE, DELAYED RELEASE ORAL
Qty: 90 CAPSULE | Refills: 1 | Status: SHIPPED | OUTPATIENT
Start: 2021-01-04

## 2021-01-09 DIAGNOSIS — R05.3 CHRONIC COUGH: ICD-10-CM

## 2021-01-12 PROBLEM — R05.3 CHRONIC COUGH: Status: ACTIVE | Noted: 2021-01-12

## 2021-01-12 RX ORDER — FLUTICASONE PROPIONATE AND SALMETEROL 100; 50 UG/1; UG/1
1 POWDER RESPIRATORY (INHALATION) 2 TIMES DAILY
Qty: 3 PACKAGE | Refills: 3 | Status: SHIPPED | OUTPATIENT
Start: 2021-01-12

## 2021-01-12 NOTE — TELEPHONE ENCOUNTER
LOV physical w/Dr izquierdo on 8/22/2020  Unclear of diagnosis, only reference for using Advair is chronic cough, added this Diagnosis to Problem list   Forward to Dr Nayeli Ramos for refill unsure of the need

## 2021-01-12 NOTE — TELEPHONE ENCOUNTER
ADVAIR 100-50 DISKUS     Sig: TAKE 1 PUFF BY MOUTH TWICE A DAY    Disp:  Not specified (Pharmacy requested: 180 Not Specified)    Refills:  1    Start: 1/9/2021    Class: Normal    Non-formulary For: Chronic cough    Last ordered: 6 months ago by Ashley Pang

## 2021-03-09 ENCOUNTER — TELEPHONE (OUTPATIENT)
Dept: FAMILY MEDICINE CLINIC | Facility: CLINIC | Age: 61
End: 2021-03-09

## 2021-03-09 NOTE — TELEPHONE ENCOUNTER
C/o yeast infection for last 2 weeks not feeling better she feels this is yeast but has no drainage.  She is wondering if she can get diflucan to treat this

## 2021-03-09 NOTE — TELEPHONE ENCOUNTER
Pt is requesting refill for abx to treat yeast infection. Pt could not recall name. Would like it sent to [facility] on [address]

## 2021-03-11 NOTE — TELEPHONE ENCOUNTER
Pt called in checking status of medication. Advised of notes pt wants to know if her insurance will pay the video visit because if not she might as well come in. Pt states she does not want to do a video visit and will call back some other time.

## 2021-04-01 ENCOUNTER — OFFICE VISIT (OUTPATIENT)
Dept: FAMILY MEDICINE CLINIC | Facility: CLINIC | Age: 61
End: 2021-04-01
Payer: COMMERCIAL

## 2021-04-01 VITALS
TEMPERATURE: 98 F | BODY MASS INDEX: 33 KG/M2 | HEART RATE: 78 BPM | DIASTOLIC BLOOD PRESSURE: 70 MMHG | RESPIRATION RATE: 18 BRPM | HEIGHT: 69 IN | SYSTOLIC BLOOD PRESSURE: 122 MMHG

## 2021-04-01 DIAGNOSIS — R05.3 CHRONIC COUGH: Primary | ICD-10-CM

## 2021-04-01 PROCEDURE — 3074F SYST BP LT 130 MM HG: CPT | Performed by: FAMILY MEDICINE

## 2021-04-01 PROCEDURE — 99213 OFFICE O/P EST LOW 20 MIN: CPT | Performed by: FAMILY MEDICINE

## 2021-04-01 PROCEDURE — 3078F DIAST BP <80 MM HG: CPT | Performed by: FAMILY MEDICINE

## 2021-04-01 PROCEDURE — 3008F BODY MASS INDEX DOCD: CPT | Performed by: FAMILY MEDICINE

## 2021-04-01 RX ORDER — PSEUDOEPHEDRINE HCL 120 MG/1
120 TABLET, FILM COATED, EXTENDED RELEASE ORAL EVERY 12 HOURS PRN
Qty: 180 TABLET | Refills: 1 | Status: SHIPPED | OUTPATIENT
Start: 2021-04-01

## 2021-04-01 RX ORDER — FLUCONAZOLE 150 MG/1
150 TABLET ORAL
Qty: 2 TABLET | Refills: 2 | Status: SHIPPED | OUTPATIENT
Start: 2021-04-01 | End: 2021-04-04

## 2021-04-01 NOTE — PROGRESS NOTES
David Robert is a 63year old female coming in for had concerns including Cough (took allegra (zertec) D, not coughing as much). HPI/Subjective:   Cough  This is a chronic problem. The current episode started more than 1 month ago.  The problem has been unchanged Behavior: Behavior normal.         Thought Content: Thought content normal.         Judgment: Judgment normal.           Assessment & Plan:   1. Chronic cough (Primary)  Overview:  Using Advair per Dr Asim Canales  Orders:  -     Pseudoephedrine HCl ER;  Take

## 2021-05-26 ENCOUNTER — TELEPHONE (OUTPATIENT)
Dept: FAMILY MEDICINE CLINIC | Facility: CLINIC | Age: 61
End: 2021-05-26

## 2021-05-26 DIAGNOSIS — Z00.00 LABORATORY EXAMINATION ORDERED AS PART OF A ROUTINE GENERAL MEDICAL EXAMINATION: ICD-10-CM

## 2021-05-26 DIAGNOSIS — E55.9 VITAMIN D DEFICIENCY: Primary | ICD-10-CM

## 2021-05-26 DIAGNOSIS — R73.03 PREDIABETES: ICD-10-CM

## 2021-05-26 DIAGNOSIS — Z12.31 VISIT FOR SCREENING MAMMOGRAM: ICD-10-CM

## 2021-05-26 DIAGNOSIS — R73.9 HYPERGLYCEMIA: ICD-10-CM

## 2021-05-26 NOTE — TELEPHONE ENCOUNTER
Please enter lab orders for the patient's upcoming physical appointment. Physical scheduled:    Your appointments     Date & Time Appointment Department [facility])    Aug 27, 2021  8:00 AM CDT Physical - Established with Jono Gann MD [facility]

## 2021-05-26 NOTE — TELEPHONE ENCOUNTER
1. Vitamin D deficiency (Primary)  -     Vitamin D, 25-Hydroxy; Future; Expected date: 05/26/2021  2. Prediabetes  Overview:  A1c 6.0% 2019  Orders:  -     Hemoglobin A1C; Future; Expected date: 05/26/2021  3.  Visit for screening mammogram  -     AYAN MOSS

## 2021-05-26 NOTE — TELEPHONE ENCOUNTER
Patient is requesting an order for her annual screening mammogram.    Patient has been notified to allow 2-3 business days for order placement. Reviewed with patient that she may schedule mammogram via iVideosongs or by calling Central Scheduling at that time.

## 2021-06-02 ENCOUNTER — LAB ENCOUNTER (OUTPATIENT)
Dept: LAB | Age: 61
End: 2021-06-02
Attending: FAMILY MEDICINE
Payer: COMMERCIAL

## 2021-06-02 DIAGNOSIS — R73.03 PREDIABETES: ICD-10-CM

## 2021-06-02 DIAGNOSIS — E55.9 VITAMIN D DEFICIENCY: ICD-10-CM

## 2021-06-02 DIAGNOSIS — Z00.00 LABORATORY EXAMINATION ORDERED AS PART OF A ROUTINE GENERAL MEDICAL EXAMINATION: ICD-10-CM

## 2021-06-02 DIAGNOSIS — R73.9 HYPERGLYCEMIA: ICD-10-CM

## 2021-06-02 PROCEDURE — 85027 COMPLETE CBC AUTOMATED: CPT

## 2021-06-02 PROCEDURE — 36415 COLL VENOUS BLD VENIPUNCTURE: CPT

## 2021-06-02 PROCEDURE — 84443 ASSAY THYROID STIM HORMONE: CPT

## 2021-06-02 PROCEDURE — 80061 LIPID PANEL: CPT

## 2021-06-02 PROCEDURE — 82306 VITAMIN D 25 HYDROXY: CPT

## 2021-06-02 PROCEDURE — 80053 COMPREHEN METABOLIC PANEL: CPT

## 2021-06-02 PROCEDURE — 83036 HEMOGLOBIN GLYCOSYLATED A1C: CPT

## 2021-06-09 ENCOUNTER — TELEPHONE (OUTPATIENT)
Dept: FAMILY MEDICINE CLINIC | Facility: CLINIC | Age: 61
End: 2021-06-09

## 2021-06-09 NOTE — TELEPHONE ENCOUNTER
LIPID PANEL: Comments to Patient     Cesar Me, your labs look okay overall.  Hemoglobin, iron containing molecule, is a little bit better than last time but still on the lower side so we will keep watching this and keep doing what you are doing, hemoglobin

## 2021-06-10 ENCOUNTER — TELEPHONE (OUTPATIENT)
Dept: FAMILY MEDICINE CLINIC | Facility: CLINIC | Age: 61
End: 2021-06-10

## 2021-06-10 DIAGNOSIS — Z23 NEED FOR TDAP VACCINATION: Primary | ICD-10-CM

## 2021-06-10 NOTE — TELEPHONE ENCOUNTER
1. Need for Tdap vaccination (Primary)  -     TETANUS, DIPHTHERIA TOXOIDS AND ACELLULAR PERTUSIS VACCINE (TDAP), >7 YEARS, IM USE       OK to notify.  Thanks, Florestine Runner, MD

## 2021-06-18 ENCOUNTER — NURSE ONLY (OUTPATIENT)
Dept: FAMILY MEDICINE CLINIC | Facility: CLINIC | Age: 61
End: 2021-06-18
Payer: COMMERCIAL

## 2021-06-18 VITALS — TEMPERATURE: 98 F

## 2021-06-18 DIAGNOSIS — Z23 NEED FOR TDAP VACCINATION: Primary | ICD-10-CM

## 2021-06-18 PROCEDURE — 90471 IMMUNIZATION ADMIN: CPT | Performed by: FAMILY MEDICINE

## 2021-06-18 PROCEDURE — 90715 TDAP VACCINE 7 YRS/> IM: CPT | Performed by: FAMILY MEDICINE

## 2021-06-18 NOTE — PROGRESS NOTES
Patient presents for TDAP. She is expecting her first grandbaby and for this reason is getting the TDAP. The TDAP was ordered by Dr. Jazlyn Winn M.D.   The TDAP is given in the left deltoid,   Patient is given the VIS

## 2021-07-06 ENCOUNTER — TELEPHONE (OUTPATIENT)
Dept: FAMILY MEDICINE CLINIC | Facility: CLINIC | Age: 61
End: 2021-07-06

## 2021-07-06 NOTE — TELEPHONE ENCOUNTER
Pt states that she has lost her sense of taste and smell since 7/2/20231. She also experienced diarrhea for two days, but the diarrhea has gone away. Her teeth ache. She states that she has a chronic cough for one year. She states that she is coughing up yellow mucus. She has scheduled a COVID test at   Call for 4 pm today. She is fully COVID vaccinated. She is concerned because she was at her daughter's baby shower and she wants to be able to help her daughter when the baby is born. She has tried Mucinex, Robitussin. Advised rest, self isolating, increased fluid intake. She will ask [facility] to fax us her test results/    She is asking for a referral to a pulmonologist because her cough has been chronic. Advised that we wait for test results first. Pt agreed with the plan. Routed to Dr Rajat Bang.

## 2021-07-06 NOTE — TELEPHONE ENCOUNTER
Pt having flu like symptoms. Has an appt to get Covid tested today although she already had her vaccines.  Has been having a cough for a while and requesting to maybe see a lung specialist? Please advise

## 2021-07-06 NOTE — TELEPHONE ENCOUNTER
Ok. Will await results.  Can do [facility] pulm (Darion/kyrie/mallorie) no referral technically needed  [facility] pulmonology  [address], [address] [place] [address]  [place], [address] [address]  [phone]

## 2021-07-07 ENCOUNTER — TELEPHONE (OUTPATIENT)
Dept: FAMILY MEDICINE CLINIC | Facility: CLINIC | Age: 61
End: 2021-07-07

## 2021-07-07 NOTE — TELEPHONE ENCOUNTER
Pt c/o cough x 4 days. Negative COVID test 7/6/21, vaccinated x 2. Pt refuses to set up video visit or come in to the office. Requesting meds be sent to pharmacy.

## 2021-07-07 NOTE — TELEPHONE ENCOUNTER
Has had cough for last 4 days productive with yellow sputum, denies fever now had fever before has sore throat  She does not feel a video visit would work. She is looking for something stronger than robitussin for the cough.  I told her I would ask Dr Mantilla Read

## 2021-07-08 RX ORDER — CODEINE PHOSPHATE AND GUAIFENESIN 10; 100 MG/5ML; MG/5ML
5 SOLUTION ORAL 4 TIMES DAILY PRN
Qty: 236 ML | Refills: 0 | Status: SHIPPED | OUTPATIENT
Start: 2021-07-08 | End: 2021-07-20

## 2021-07-08 NOTE — TELEPHONE ENCOUNTER
Please notify: I sent this in electronically    Requested Prescriptions     Signed Prescriptions Disp Refills   • guaiFENesin-codeine (CHERATUSSIN AC) 100-10 MG/5ML Oral Solution 236 mL 0     Sig: Take 5 mL by mouth 4 (four) times daily as needed for cough

## 2021-07-12 DIAGNOSIS — I10 ESSENTIAL HYPERTENSION: ICD-10-CM

## 2021-07-16 RX ORDER — AMLODIPINE BESYLATE 5 MG/1
TABLET ORAL
Qty: 90 TABLET | Refills: 3 | Status: SHIPPED | OUTPATIENT
Start: 2021-07-16

## 2021-07-16 NOTE — TELEPHONE ENCOUNTER
Requested Prescriptions     Pending Prescriptions Disp Refills   • AMLODIPINE BESYLATE 5 MG Oral Tab [Pharmacy Med Name: AMLODIPINE BESYLATE 5 MG TAB] 90 tablet 3     Sig: TAKE 1 TABLET BY MOUTH EVERY DAY     LOV 4/1/2021     Patient was asked to follow-up

## 2021-08-26 PROBLEM — Z86.0100 PERSONAL HISTORY OF COLONIC POLYPS: Status: RESOLVED | Noted: 2020-12-04 | Resolved: 2021-08-26

## 2021-08-26 PROBLEM — Z86.010 PERSONAL HISTORY OF COLONIC POLYPS: Status: RESOLVED | Noted: 2020-12-04 | Resolved: 2021-08-26

## 2021-08-27 ENCOUNTER — HOSPITAL ENCOUNTER (OUTPATIENT)
Dept: GENERAL RADIOLOGY | Age: 61
Discharge: HOME | End: 2021-08-27
Attending: FAMILY MEDICINE
Payer: COMMERCIAL

## 2021-08-27 ENCOUNTER — OFFICE VISIT (OUTPATIENT)
Dept: FAMILY MEDICINE CLINIC | Facility: CLINIC | Age: 61
End: 2021-08-27
Payer: COMMERCIAL

## 2021-08-27 VITALS
SYSTOLIC BLOOD PRESSURE: 128 MMHG | HEART RATE: 68 BPM | BODY MASS INDEX: 32.2 KG/M2 | HEIGHT: 69 IN | RESPIRATION RATE: 14 BRPM | WEIGHT: 217.38 LBS | DIASTOLIC BLOOD PRESSURE: 70 MMHG | OXYGEN SATURATION: 92 %

## 2021-08-27 DIAGNOSIS — Z01.419 WELL WOMAN EXAM WITH ROUTINE GYNECOLOGICAL EXAM: ICD-10-CM

## 2021-08-27 DIAGNOSIS — R05.3 CHRONIC COUGH: ICD-10-CM

## 2021-08-27 DIAGNOSIS — Z00.00 ANNUAL PHYSICAL EXAM: Primary | ICD-10-CM

## 2021-08-27 DIAGNOSIS — I10 ESSENTIAL HYPERTENSION: ICD-10-CM

## 2021-08-27 DIAGNOSIS — R73.03 PREDIABETES: ICD-10-CM

## 2021-08-27 DIAGNOSIS — E05.00 GRAVES' DISEASE: ICD-10-CM

## 2021-08-27 PROCEDURE — 71046 X-RAY EXAM CHEST 2 VIEWS: CPT | Performed by: FAMILY MEDICINE

## 2021-08-27 PROCEDURE — 3008F BODY MASS INDEX DOCD: CPT | Performed by: FAMILY MEDICINE

## 2021-08-27 PROCEDURE — 87624 HPV HI-RISK TYP POOLED RSLT: CPT | Performed by: FAMILY MEDICINE

## 2021-08-27 PROCEDURE — 88175 CYTOPATH C/V AUTO FLUID REDO: CPT | Performed by: FAMILY MEDICINE

## 2021-08-27 PROCEDURE — 3074F SYST BP LT 130 MM HG: CPT | Performed by: FAMILY MEDICINE

## 2021-08-27 PROCEDURE — 3078F DIAST BP <80 MM HG: CPT | Performed by: FAMILY MEDICINE

## 2021-08-27 PROCEDURE — 99396 PREV VISIT EST AGE 40-64: CPT | Performed by: FAMILY MEDICINE

## 2021-08-27 RX ORDER — FLUCONAZOLE 150 MG/1
150 TABLET ORAL
Qty: 2 TABLET | Refills: 0 | Status: SHIPPED | OUTPATIENT
Start: 2021-08-27 | End: 2021-08-31

## 2021-08-27 NOTE — ASSESSMENT & PLAN NOTE
Stable, Continue present management.     Thyroid  (most recent labs)   Lab Results   Component Value Date/Time    TSH 3.340 06/02/2021 08:15 AM    T4F 1.3 08/14/2020 09:56 AM    TSHT4 <0.01 (L) 12/22/2014 12:11 PM         Endocrine Medications          Levo

## 2021-08-27 NOTE — PROGRESS NOTES
Regan Garduno is a 63year old female who presents for a complete physical exam.     had concerns including Physical (WWE, with pap ).    Her last annual assessment has been over 1 year: Annual Physical due on 08/22/2021       HPI/Subjective:     Sympgardenia Zoster Vaccines(1 of 2) Never done  Pap Smear,3 Years due on 08/20/2021  Annual Depression Screen due on 08/22/2021  Annual Physical due on 08/22/2021  Mammogram due on 08/31/2021      Review of Systems   Constitutional: Negative.   Negative for fatigue, education: Not on file      Highest education level: Not on file    Occupational History      Occupation:  assistant    Tobacco Use      Smoking status: Never Smoker      Smokeless tobacco: Never Used    Vaping Use      Vaping Use: Never used and S2 normal. No murmur heard. Pulmonary:      Effort: Pulmonary effort is normal.      Breath sounds: Normal breath sounds. Chest:      Chest wall: No mass. Breasts: Breasts are symmetrical.         Right: No inverted nipple or tenderness. issues and agrees to the plan. The patient is asked to return for CPX in 1 years. Assessment:  1. Annual physical exam (Primary)  2. Essential hypertension  Overview:  Amlodipine 5  3.  Graves' disease  Overview:  Managed by Dr Vannesa Candelario, Dx 1.2015, on levoth file.

## 2021-08-30 LAB — HPV I/H RISK 1 DNA SPEC QL NAA+PROBE: NEGATIVE

## 2021-09-01 ENCOUNTER — TELEPHONE (OUTPATIENT)
Dept: FAMILY MEDICINE CLINIC | Facility: CLINIC | Age: 61
End: 2021-09-01

## 2021-09-01 ENCOUNTER — ORDER TRANSCRIPTION (OUTPATIENT)
Dept: ADMINISTRATIVE | Facility: HOSPITAL | Age: 61
End: 2021-09-01

## 2021-09-01 DIAGNOSIS — R05.3 CHRONIC COUGH: Primary | ICD-10-CM

## 2021-09-01 NOTE — TELEPHONE ENCOUNTER
Pt is calling she just received her pap results on My Chart and is not sure how to read them please call to review with her

## 2021-09-04 ENCOUNTER — HOSPITAL ENCOUNTER (OUTPATIENT)
Dept: MAMMOGRAPHY | Age: 61
Discharge: HOME | End: 2021-09-04
Attending: FAMILY MEDICINE
Payer: COMMERCIAL

## 2021-09-04 DIAGNOSIS — Z12.31 VISIT FOR SCREENING MAMMOGRAM: ICD-10-CM

## 2021-09-04 PROCEDURE — 77067 SCR MAMMO BI INCL CAD: CPT | Performed by: FAMILY MEDICINE

## 2021-09-04 PROCEDURE — 77063 BREAST TOMOSYNTHESIS BI: CPT | Performed by: FAMILY MEDICINE

## 2021-09-06 ENCOUNTER — LAB ENCOUNTER (OUTPATIENT)
Dept: LAB | Facility: HOSPITAL | Age: 61
End: 2021-09-06
Attending: FAMILY MEDICINE
Payer: COMMERCIAL

## 2021-09-06 DIAGNOSIS — R05.3 CHRONIC COUGH: ICD-10-CM

## 2021-09-08 ENCOUNTER — TELEPHONE (OUTPATIENT)
Dept: FAMILY MEDICINE CLINIC | Facility: CLINIC | Age: 61
End: 2021-09-08

## 2021-09-08 LAB — SARS-COV-2 RNA RESP QL NAA+PROBE: NOT DETECTED

## 2021-09-09 ENCOUNTER — RT VISIT (OUTPATIENT)
Dept: RESPIRATORY THERAPY | Facility: HOSPITAL | Age: 61
End: 2021-09-09
Attending: FAMILY MEDICINE
Payer: COMMERCIAL

## 2021-09-09 DIAGNOSIS — R05.3 CHRONIC COUGH: ICD-10-CM

## 2021-09-09 PROCEDURE — 94010 BREATHING CAPACITY TEST: CPT

## 2021-09-09 NOTE — TELEPHONE ENCOUNTER
Called LMOM to call back Covid is Neg
Called told her that Covid was neg she will get test done
Pt requesting to discuss COVID results today because she is not able to understand the message. Pt is requesting a call back today because she has a test that's needs to be done tomorrow 09/09/21 and the results are needed.
supple/no JVD

## 2021-09-09 NOTE — PROCEDURES
Spirometry Findings:  FEV1 is 2.89L, 117% predicted. FVC is 3.42L, 108% predicted. FEV1/ FVC ratio is 0.84. The flow-volume loop demonstrates a normal pattern.     Impression:  There is no airway obstruction on spirometry and visualized   on flow-volume

## 2022-04-14 ENCOUNTER — TELEPHONE (OUTPATIENT)
Dept: FAMILY MEDICINE CLINIC | Facility: CLINIC | Age: 62
End: 2022-04-14

## 2022-04-14 NOTE — TELEPHONE ENCOUNTER
Please enter lab orders for the patient's upcoming physical appointment. Physical scheduled: Your appointments     Date & Time Appointment Department [facility])    Aug 31, 2022  8:00 AM CDT Physical - Established with Najma Mina MD [facility], [address], [place]  ([facility])            [address] [place] [address] [id number]         Preferred lab: [address] [facility])     The patient has been notified to complete fasting labs prior to their physical appointment.

## 2022-07-27 DIAGNOSIS — I10 ESSENTIAL HYPERTENSION: ICD-10-CM

## 2022-07-29 RX ORDER — AMLODIPINE BESYLATE 5 MG/1
TABLET ORAL
Qty: 90 TABLET | Refills: 3 | Status: SHIPPED | OUTPATIENT
Start: 2022-07-29

## 2022-08-11 ENCOUNTER — LAB ENCOUNTER (OUTPATIENT)
Dept: LAB | Age: 62
End: 2022-08-11
Attending: FAMILY MEDICINE
Payer: COMMERCIAL

## 2022-08-11 DIAGNOSIS — Z00.00 LABORATORY EXAMINATION ORDERED AS PART OF A ROUTINE GENERAL MEDICAL EXAMINATION: ICD-10-CM

## 2022-08-11 DIAGNOSIS — R73.03 PREDIABETES: ICD-10-CM

## 2022-08-11 DIAGNOSIS — E05.00 GRAVES' DISEASE: ICD-10-CM

## 2022-08-11 DIAGNOSIS — R73.9 HYPERGLYCEMIA: ICD-10-CM

## 2022-08-11 DIAGNOSIS — D64.9 ANEMIA, UNSPECIFIED TYPE: ICD-10-CM

## 2022-08-11 DIAGNOSIS — Z13.0 SCREENING FOR IRON DEFICIENCY ANEMIA: ICD-10-CM

## 2022-08-11 DIAGNOSIS — E55.9 VITAMIN D DEFICIENCY: ICD-10-CM

## 2022-08-11 LAB
ALBUMIN SERPL-MCNC: 3.6 G/DL (ref 3.4–5)
ALBUMIN/GLOB SERPL: 0.9 {RATIO} (ref 1–2)
ALP LIVER SERPL-CCNC: 78 U/L
ALT SERPL-CCNC: 15 U/L
ANION GAP SERPL CALC-SCNC: 6 MMOL/L (ref 0–18)
AST SERPL-CCNC: 12 U/L (ref 15–37)
BILIRUB SERPL-MCNC: 0.6 MG/DL (ref 0.1–2)
BUN BLD-MCNC: 13 MG/DL (ref 7–18)
BUN/CREAT SERPL: 10.9 (ref 10–20)
CALCIUM BLD-MCNC: 9.4 MG/DL (ref 8.5–10.1)
CHLORIDE SERPL-SCNC: 108 MMOL/L (ref 98–112)
CHOLEST SERPL-MCNC: 193 MG/DL (ref ?–200)
CO2 SERPL-SCNC: 26 MMOL/L (ref 21–32)
CREAT BLD-MCNC: 1.19 MG/DL
DEPRECATED HBV CORE AB SER IA-ACNC: 128 NG/ML
DEPRECATED RDW RBC AUTO: 45.1 FL (ref 35.1–46.3)
ERYTHROCYTE [DISTWIDTH] IN BLOOD BY AUTOMATED COUNT: 13.9 % (ref 11–15)
EST. AVERAGE GLUCOSE BLD GHB EST-MCNC: 126 MG/DL (ref 68–126)
FASTING PATIENT LIPID ANSWER: YES
FASTING STATUS PATIENT QL REPORTED: YES
GFR SERPLBLD BASED ON 1.73 SQ M-ARVRAT: 52 ML/MIN/1.73M2 (ref 60–?)
GLOBULIN PLAS-MCNC: 3.9 G/DL (ref 2.8–4.4)
GLUCOSE BLD-MCNC: 96 MG/DL (ref 70–99)
HBA1C MFR BLD: 6 % (ref ?–5.7)
HCT VFR BLD AUTO: 37.3 %
HDLC SERPL-MCNC: 44 MG/DL (ref 40–59)
HGB BLD-MCNC: 11.9 G/DL
IRON SATN MFR SERPL: 22 %
IRON SERPL-MCNC: 67 UG/DL
LDLC SERPL CALC-MCNC: 131 MG/DL (ref ?–100)
MCH RBC QN AUTO: 28.4 PG (ref 26–34)
MCHC RBC AUTO-ENTMCNC: 31.9 G/DL (ref 31–37)
MCV RBC AUTO: 89 FL
NONHDLC SERPL-MCNC: 149 MG/DL (ref ?–130)
OSMOLALITY SERPL CALC.SUM OF ELEC: 290 MOSM/KG (ref 275–295)
PLATELET # BLD AUTO: 243 10(3)UL (ref 150–450)
POTASSIUM SERPL-SCNC: 4.4 MMOL/L (ref 3.5–5.1)
PROT SERPL-MCNC: 7.5 G/DL (ref 6.4–8.2)
RBC # BLD AUTO: 4.19 X10(6)UL
SODIUM SERPL-SCNC: 140 MMOL/L (ref 136–145)
TIBC SERPL-MCNC: 307 UG/DL (ref 240–450)
TRANSFERRIN SERPL-MCNC: 206 MG/DL (ref 200–360)
TRIGL SERPL-MCNC: 97 MG/DL (ref 30–149)
TSI SER-ACNC: 0.81 MIU/ML (ref 0.36–3.74)
VIT D+METAB SERPL-MCNC: 54.4 NG/ML (ref 30–100)
VLDLC SERPL CALC-MCNC: 18 MG/DL (ref 0–30)
WBC # BLD AUTO: 5.7 X10(3) UL (ref 4–11)

## 2022-08-11 PROCEDURE — 84466 ASSAY OF TRANSFERRIN: CPT

## 2022-08-11 PROCEDURE — 82306 VITAMIN D 25 HYDROXY: CPT

## 2022-08-11 PROCEDURE — 83540 ASSAY OF IRON: CPT

## 2022-08-11 PROCEDURE — 36415 COLL VENOUS BLD VENIPUNCTURE: CPT

## 2022-08-11 PROCEDURE — 85027 COMPLETE CBC AUTOMATED: CPT

## 2022-08-11 PROCEDURE — 83036 HEMOGLOBIN GLYCOSYLATED A1C: CPT

## 2022-08-11 PROCEDURE — 82728 ASSAY OF FERRITIN: CPT

## 2022-08-11 PROCEDURE — 80061 LIPID PANEL: CPT

## 2022-08-11 PROCEDURE — 80053 COMPREHEN METABOLIC PANEL: CPT

## 2022-08-11 PROCEDURE — 84443 ASSAY THYROID STIM HORMONE: CPT

## 2022-08-30 NOTE — ASSESSMENT & PLAN NOTE
Stable, Continue present management.     Thyroid  (most recent labs)   Lab Results   Component Value Date/Time    TSH 0.807 08/11/2022 08:42 AM    T4F 1.3 08/14/2020 09:56 AM    TSHT4 <0.01 (L) 12/22/2014 12:11 PM         Endocrine Medications          Levothyroxine Sodium 125 MCG Oral Tab

## 2022-08-30 NOTE — ASSESSMENT & PLAN NOTE
As for her Pre-Diabetes, it is well controlled, no significant medication side effects noted. Recommendations are: continue present meds, lose weight by increased dietary compliance and exercise and will check labs as ordered .     Lab Results   Component Value Date    A1C 6.0 (H) 08/11/2022    A1C 6.2 (H) 06/02/2021

## 2022-08-30 NOTE — ASSESSMENT & PLAN NOTE
Stable, Continue present management.     Blood Pressure and Cardiac Medications          AMLODIPINE 5 MG Oral Tab

## 2022-08-31 ENCOUNTER — TELEPHONE (OUTPATIENT)
Dept: FAMILY MEDICINE CLINIC | Facility: CLINIC | Age: 62
End: 2022-08-31

## 2022-08-31 ENCOUNTER — OFFICE VISIT (OUTPATIENT)
Dept: FAMILY MEDICINE CLINIC | Facility: CLINIC | Age: 62
End: 2022-08-31
Payer: COMMERCIAL

## 2022-08-31 VITALS
WEIGHT: 220.81 LBS | HEIGHT: 68.5 IN | DIASTOLIC BLOOD PRESSURE: 60 MMHG | BODY MASS INDEX: 33.08 KG/M2 | RESPIRATION RATE: 18 BRPM | SYSTOLIC BLOOD PRESSURE: 120 MMHG | HEART RATE: 78 BPM

## 2022-08-31 DIAGNOSIS — Z00.00 ANNUAL PHYSICAL EXAM: Primary | ICD-10-CM

## 2022-08-31 DIAGNOSIS — R05.3 CHRONIC COUGH: ICD-10-CM

## 2022-08-31 DIAGNOSIS — R73.03 PREDIABETES: ICD-10-CM

## 2022-08-31 DIAGNOSIS — I10 ESSENTIAL HYPERTENSION: ICD-10-CM

## 2022-08-31 DIAGNOSIS — E05.00 GRAVES' DISEASE: ICD-10-CM

## 2022-08-31 PROCEDURE — 3078F DIAST BP <80 MM HG: CPT | Performed by: FAMILY MEDICINE

## 2022-08-31 PROCEDURE — 90471 IMMUNIZATION ADMIN: CPT | Performed by: FAMILY MEDICINE

## 2022-08-31 PROCEDURE — 99396 PREV VISIT EST AGE 40-64: CPT | Performed by: FAMILY MEDICINE

## 2022-08-31 PROCEDURE — 90750 HZV VACC RECOMBINANT IM: CPT | Performed by: FAMILY MEDICINE

## 2022-08-31 PROCEDURE — 3074F SYST BP LT 130 MM HG: CPT | Performed by: FAMILY MEDICINE

## 2022-08-31 PROCEDURE — 3008F BODY MASS INDEX DOCD: CPT | Performed by: FAMILY MEDICINE

## 2022-09-07 ENCOUNTER — HOSPITAL ENCOUNTER (OUTPATIENT)
Dept: MAMMOGRAPHY | Age: 62
Discharge: HOME | End: 2022-09-07
Attending: FAMILY MEDICINE
Payer: COMMERCIAL

## 2022-09-07 DIAGNOSIS — Z12.31 VISIT FOR SCREENING MAMMOGRAM: ICD-10-CM

## 2022-09-07 PROCEDURE — 77063 BREAST TOMOSYNTHESIS BI: CPT | Performed by: FAMILY MEDICINE

## 2022-09-07 PROCEDURE — 77067 SCR MAMMO BI INCL CAD: CPT | Performed by: FAMILY MEDICINE

## 2022-10-14 ENCOUNTER — TELEPHONE (OUTPATIENT)
Dept: FAMILY MEDICINE CLINIC | Facility: CLINIC | Age: 62
End: 2022-10-14

## 2022-10-14 NOTE — TELEPHONE ENCOUNTER
Called and talke abel richards told her to try the zyrtec daily for 4-5 days and if not better then we need to see her for this.

## 2022-10-14 NOTE — TELEPHONE ENCOUNTER
Not sure, we can do a follow-up visit to discuss but I am not sure what else to do at this point. If it is only happening once every 2 to 3 months it may be better to watch it unless it starts happening more often.

## 2022-10-14 NOTE — TELEPHONE ENCOUNTER
Pt is calling she said she feels like something is crawling under her skin. She said all her labs came back normal and her thyroid doctor told her to reach out to her PCP.  Please call after asking Dr. Kvng Mederos what he recommends

## 2022-10-27 NOTE — PATIENT INSTRUCTIONS
Sánchez Leavitt, and Brigido Barcenas 21,- OBGYN  [address] and [facility] [address], Chelsea Tyson, Nirali Jenkins, and [facility]  [address]  [phone] stated

## 2022-11-01 ENCOUNTER — OFFICE VISIT (OUTPATIENT)
Facility: CLINIC | Age: 62
End: 2022-11-01
Payer: COMMERCIAL

## 2022-11-01 VITALS
BODY MASS INDEX: 31.99 KG/M2 | SYSTOLIC BLOOD PRESSURE: 120 MMHG | HEART RATE: 85 BPM | WEIGHT: 216 LBS | HEIGHT: 69 IN | DIASTOLIC BLOOD PRESSURE: 78 MMHG

## 2022-11-01 DIAGNOSIS — N89.8 VAGINAL ODOR: ICD-10-CM

## 2022-11-01 DIAGNOSIS — R35.0 URINARY FREQUENCY: ICD-10-CM

## 2022-11-01 DIAGNOSIS — R39.15 URINARY URGENCY: ICD-10-CM

## 2022-11-01 DIAGNOSIS — Z01.419 ENCOUNTER FOR ANNUAL ROUTINE GYNECOLOGICAL EXAMINATION: Primary | ICD-10-CM

## 2022-11-01 DIAGNOSIS — R32 URINARY INCONTINENCE, UNSPECIFIED TYPE: ICD-10-CM

## 2022-11-01 PROCEDURE — 87480 CANDIDA DNA DIR PROBE: CPT | Performed by: STUDENT IN AN ORGANIZED HEALTH CARE EDUCATION/TRAINING PROGRAM

## 2022-11-01 PROCEDURE — 3008F BODY MASS INDEX DOCD: CPT | Performed by: STUDENT IN AN ORGANIZED HEALTH CARE EDUCATION/TRAINING PROGRAM

## 2022-11-01 PROCEDURE — 87510 GARDNER VAG DNA DIR PROBE: CPT | Performed by: STUDENT IN AN ORGANIZED HEALTH CARE EDUCATION/TRAINING PROGRAM

## 2022-11-01 PROCEDURE — 3074F SYST BP LT 130 MM HG: CPT | Performed by: STUDENT IN AN ORGANIZED HEALTH CARE EDUCATION/TRAINING PROGRAM

## 2022-11-01 PROCEDURE — 87660 TRICHOMONAS VAGIN DIR PROBE: CPT | Performed by: STUDENT IN AN ORGANIZED HEALTH CARE EDUCATION/TRAINING PROGRAM

## 2022-11-01 PROCEDURE — 99386 PREV VISIT NEW AGE 40-64: CPT | Performed by: STUDENT IN AN ORGANIZED HEALTH CARE EDUCATION/TRAINING PROGRAM

## 2022-11-01 PROCEDURE — 3078F DIAST BP <80 MM HG: CPT | Performed by: STUDENT IN AN ORGANIZED HEALTH CARE EDUCATION/TRAINING PROGRAM

## 2022-11-07 ENCOUNTER — NURSE ONLY (OUTPATIENT)
Dept: FAMILY MEDICINE CLINIC | Facility: CLINIC | Age: 62
End: 2022-11-07
Payer: COMMERCIAL

## 2022-11-07 VITALS — TEMPERATURE: 98 F

## 2022-11-07 DIAGNOSIS — Z23 NEED FOR SHINGLES VACCINE: Primary | ICD-10-CM

## 2022-11-07 PROCEDURE — 90750 HZV VACC RECOMBINANT IM: CPT | Performed by: FAMILY MEDICINE

## 2022-11-07 PROCEDURE — 90471 IMMUNIZATION ADMIN: CPT | Performed by: FAMILY MEDICINE

## 2022-11-07 NOTE — PROGRESS NOTES
Patient presents for Shingrix dose two. No complaints. Shingrix was ordered by Dr. Javier Ledesma M.D. The Shingrix is given in the left deltoid. Patient signed waiver and given VIS.    Patient left without complaints

## 2023-03-27 ENCOUNTER — TELEPHONE (OUTPATIENT)
Dept: FAMILY MEDICINE CLINIC | Facility: CLINIC | Age: 63
End: 2023-03-27

## 2023-03-27 DIAGNOSIS — Z12.31 BREAST CANCER SCREENING BY MAMMOGRAM: Primary | ICD-10-CM

## 2023-03-27 DIAGNOSIS — Z12.31 VISIT FOR SCREENING MAMMOGRAM: ICD-10-CM

## 2023-03-27 DIAGNOSIS — Z00.00 LABORATORY EXAMINATION ORDERED AS PART OF A ROUTINE GENERAL MEDICAL EXAMINATION: ICD-10-CM

## 2023-03-27 DIAGNOSIS — R73.9 HYPERGLYCEMIA: ICD-10-CM

## 2023-03-27 DIAGNOSIS — R73.03 PREDIABETES: ICD-10-CM

## 2023-03-27 DIAGNOSIS — E89.0 POSTABLATIVE HYPOTHYROIDISM: ICD-10-CM

## 2023-03-27 DIAGNOSIS — E05.00 GRAVES' DISEASE: ICD-10-CM

## 2023-03-27 DIAGNOSIS — E55.9 VITAMIN D DEFICIENCY: Primary | ICD-10-CM

## 2023-03-27 NOTE — TELEPHONE ENCOUNTER
Patient is requesting an order for her annual screening mammogram.    Patient has been notified to allow 2-3 business days for order placement. Reviewed with patient that she may schedule mammogram via "[employer]"t or by calling Central Scheduling at that time. Request for mammogram order routed to triage.

## 2023-03-27 NOTE — TELEPHONE ENCOUNTER
Please enter lab orders for the patient's upcoming physical appointment. Physical scheduled: Your appointments     Date & Time Appointment Department [facility])    Sep 11, 2023 12:45 PM CDT Physical - Established with Esha Palomares MD [address], [address], [place] ([address])            Rodolfo Rodriguez [address] [address] [id number]         Preferred lab: [facility])     The patient has been notified to complete fasting labs prior to their physical appointment.

## 2023-04-14 ENCOUNTER — TELEPHONE (OUTPATIENT)
Facility: CLINIC | Age: 63
End: 2023-04-14

## 2023-07-27 DIAGNOSIS — I10 ESSENTIAL HYPERTENSION: ICD-10-CM

## 2023-07-31 RX ORDER — AMLODIPINE BESYLATE 5 MG/1
TABLET ORAL
Qty: 90 TABLET | Refills: 1 | Status: SHIPPED | OUTPATIENT
Start: 2023-07-31

## 2023-07-31 NOTE — TELEPHONE ENCOUNTER
Requested Prescriptions     Pending Prescriptions Disp Refills    AMLODIPINE 5 MG Oral Tab [Pharmacy Med Name: AMLODIPINE BESYLATE 5 MG TAB] 90 tablet 3     Sig: TAKE 1 TABLET BY MOUTH EVERY DAY     LOV 8/31/2022     Patient was asked to follow-up in:     Appointment scheduled: 9/11/2023 Maggie Lyn MD     Medication refilled per protocol.

## 2023-08-06 NOTE — TELEPHONE ENCOUNTER
1. Vitamin D deficiency (Primary)  -     Vitamin D, 25-Hydroxy; Future; Expected date: 08/06/2023  2. Prediabetes  Overview:  A1c 6.0% 2019  Orders:  -     Comp Metabolic Panel (14); Future; Expected date: 08/06/2023  -     Hemoglobin A1C; Future; Expected date: 08/06/2023  3. Postablative hypothyroidism  -     TSH W Reflex To Free T4; Future; Expected date: 08/06/2023  -     Lipid Panel; Future; Expected date: 08/06/2023  4. Graves' disease  Overview:  Managed by Dr Gisela Mckeon, Dx 1.2015, on levothyroxine 100  Orders:  -     TSH W Reflex To Free T4; Future; Expected date: 08/06/2023  -     CBC, Platelet; No Differential; Future; Expected date: 08/06/2023  5. Visit for screening mammogram  -     [facility] SCREENING BILAT (CPT=77067); Future; Expected date: 08/06/2023  6. Laboratory examination ordered as part of a routine general medical examination  -     TSH W Reflex To Free T4; Future; Expected date: 08/06/2023  -     Lipid Panel; Future; Expected date: 08/06/2023  -     CBC, Platelet; No Differential; Future; Expected date: 08/06/2023  -     Comp Metabolic Panel (14); Future; Expected date: 08/06/2023  7. Hyperglycemia  -     Hemoglobin A1C; Future; Expected date: 08/06/2023       OK to notify.  Thanks, Raymond Arora MD

## 2023-08-16 ENCOUNTER — TELEPHONE (OUTPATIENT)
Dept: FAMILY MEDICINE CLINIC | Facility: CLINIC | Age: 63
End: 2023-08-16

## 2023-08-16 NOTE — TELEPHONE ENCOUNTER
Pt call because is having headache everyday and want to know if this is for the BP medication , request a nurse to call back.

## 2023-08-16 NOTE — TELEPHONE ENCOUNTER
Has been having headache (tension headaches) with the change in the BP medication she feels this is the cause. She has not taken her BP as she does not have a machine. I asked her to take her BP for the next 2 days and then contact us with the results. If she is running high or is unable to test we need to see her.

## 2023-09-05 ENCOUNTER — LAB ENCOUNTER (OUTPATIENT)
Dept: LAB | Age: 63
End: 2023-09-05
Attending: FAMILY MEDICINE
Payer: COMMERCIAL

## 2023-09-05 DIAGNOSIS — E89.0 POSTABLATIVE HYPOTHYROIDISM: ICD-10-CM

## 2023-09-05 DIAGNOSIS — E55.9 VITAMIN D DEFICIENCY: ICD-10-CM

## 2023-09-05 DIAGNOSIS — R73.9 HYPERGLYCEMIA: ICD-10-CM

## 2023-09-05 DIAGNOSIS — R73.03 PREDIABETES: ICD-10-CM

## 2023-09-05 DIAGNOSIS — Z00.00 LABORATORY EXAMINATION ORDERED AS PART OF A ROUTINE GENERAL MEDICAL EXAMINATION: ICD-10-CM

## 2023-09-05 DIAGNOSIS — E05.00 GRAVES' DISEASE: ICD-10-CM

## 2023-09-05 LAB
ALBUMIN SERPL-MCNC: 3.5 G/DL (ref 3.4–5)
ALBUMIN/GLOB SERPL: 0.8 {RATIO} (ref 1–2)
ALP LIVER SERPL-CCNC: 74 U/L
ALT SERPL-CCNC: 15 U/L
ANION GAP SERPL CALC-SCNC: 5 MMOL/L (ref 0–18)
AST SERPL-CCNC: 13 U/L (ref 15–37)
BILIRUB SERPL-MCNC: 0.4 MG/DL (ref 0.1–2)
BUN BLD-MCNC: 13 MG/DL (ref 7–18)
CALCIUM BLD-MCNC: 9.4 MG/DL (ref 8.5–10.1)
CHLORIDE SERPL-SCNC: 108 MMOL/L (ref 98–112)
CHOLEST SERPL-MCNC: 217 MG/DL (ref ?–200)
CO2 SERPL-SCNC: 26 MMOL/L (ref 21–32)
CREAT BLD-MCNC: 1.1 MG/DL
EGFRCR SERPLBLD CKD-EPI 2021: 56 ML/MIN/1.73M2 (ref 60–?)
ERYTHROCYTE [DISTWIDTH] IN BLOOD BY AUTOMATED COUNT: 13.8 %
EST. AVERAGE GLUCOSE BLD GHB EST-MCNC: 123 MG/DL (ref 68–126)
FASTING PATIENT LIPID ANSWER: YES
FASTING STATUS PATIENT QL REPORTED: YES
GLOBULIN PLAS-MCNC: 4.3 G/DL (ref 2.8–4.4)
GLUCOSE BLD-MCNC: 94 MG/DL (ref 70–99)
HBA1C MFR BLD: 5.9 % (ref ?–5.7)
HCT VFR BLD AUTO: 39.8 %
HDLC SERPL-MCNC: 51 MG/DL (ref 40–59)
HGB BLD-MCNC: 12.5 G/DL
LDLC SERPL CALC-MCNC: 148 MG/DL (ref ?–100)
MCH RBC QN AUTO: 28.4 PG (ref 26–34)
MCHC RBC AUTO-ENTMCNC: 31.4 G/DL (ref 31–37)
MCV RBC AUTO: 90.5 FL
NONHDLC SERPL-MCNC: 166 MG/DL (ref ?–130)
OSMOLALITY SERPL CALC.SUM OF ELEC: 288 MOSM/KG (ref 275–295)
PLATELET # BLD AUTO: 221 10(3)UL (ref 150–450)
POTASSIUM SERPL-SCNC: 4.3 MMOL/L (ref 3.5–5.1)
PROT SERPL-MCNC: 7.8 G/DL (ref 6.4–8.2)
RBC # BLD AUTO: 4.4 X10(6)UL
SODIUM SERPL-SCNC: 139 MMOL/L (ref 136–145)
TRIGL SERPL-MCNC: 103 MG/DL (ref 30–149)
TSI SER-ACNC: 0.44 MIU/ML (ref 0.36–3.74)
VIT D+METAB SERPL-MCNC: 34.9 NG/ML (ref 30–100)
VLDLC SERPL CALC-MCNC: 19 MG/DL (ref 0–30)
WBC # BLD AUTO: 5.7 X10(3) UL (ref 4–11)

## 2023-09-05 PROCEDURE — 36415 COLL VENOUS BLD VENIPUNCTURE: CPT

## 2023-09-05 PROCEDURE — 82306 VITAMIN D 25 HYDROXY: CPT

## 2023-09-05 PROCEDURE — 83036 HEMOGLOBIN GLYCOSYLATED A1C: CPT

## 2023-09-05 PROCEDURE — 80061 LIPID PANEL: CPT

## 2023-09-05 PROCEDURE — 85027 COMPLETE CBC AUTOMATED: CPT

## 2023-09-05 PROCEDURE — 84443 ASSAY THYROID STIM HORMONE: CPT

## 2023-09-05 PROCEDURE — 80053 COMPREHEN METABOLIC PANEL: CPT

## 2023-09-08 RX ORDER — CLINDAMYCIN HYDROCHLORIDE 300 MG/1
CAPSULE ORAL
COMMUNITY
Start: 2023-08-11 | End: 2023-08-21

## 2023-09-09 NOTE — ASSESSMENT & PLAN NOTE
BP shows good control with last BP of 120/78. Continue lifestyle changes, diet, exercise and weight loss. Last K was 4.3 done on 9/5/2023. Last Cr was 1.1 done on 9/5/2023.

## 2023-09-09 NOTE — ASSESSMENT & PLAN NOTE
Thyroid shows Good control. TSH: 0.436, done on 9/5/2023. FT4: 1.3, done on 8/14/2020. FT3: 2.39, done on 6/7/2017. Thyroid therapy includes Levothyroxine Sodium 125 MCG Oral Tab [[id number]].

## 2023-09-09 NOTE — ASSESSMENT & PLAN NOTE
Last A1c value was 5.9% done 9/5/2023. Sugar control is well controlled, no significant medication side effects noted, Pre-Diabetic. Not currently on Diabetic meds.

## 2023-09-11 ENCOUNTER — OFFICE VISIT (OUTPATIENT)
Dept: FAMILY MEDICINE CLINIC | Facility: CLINIC | Age: 63
End: 2023-09-11
Payer: COMMERCIAL

## 2023-09-11 VITALS
HEART RATE: 82 BPM | HEIGHT: 69 IN | DIASTOLIC BLOOD PRESSURE: 64 MMHG | BODY MASS INDEX: 31.49 KG/M2 | SYSTOLIC BLOOD PRESSURE: 122 MMHG | RESPIRATION RATE: 14 BRPM | WEIGHT: 212.63 LBS

## 2023-09-11 DIAGNOSIS — E55.9 VITAMIN D DEFICIENCY: ICD-10-CM

## 2023-09-11 DIAGNOSIS — R06.02 SOB (SHORTNESS OF BREATH): ICD-10-CM

## 2023-09-11 DIAGNOSIS — E05.00 GRAVES' DISEASE: ICD-10-CM

## 2023-09-11 DIAGNOSIS — R73.03 PREDIABETES: ICD-10-CM

## 2023-09-11 DIAGNOSIS — I10 ESSENTIAL HYPERTENSION: Primary | ICD-10-CM

## 2023-09-11 DIAGNOSIS — E89.0 POSTABLATIVE HYPOTHYROIDISM: ICD-10-CM

## 2023-09-11 RX ORDER — LEVOTHYROXINE SODIUM 137 UG/1
125 TABLET ORAL
COMMUNITY
Start: 2023-09-11

## 2023-09-19 ENCOUNTER — HOSPITAL ENCOUNTER (OUTPATIENT)
Dept: CV DIAGNOSTICS | Facility: HOSPITAL | Age: 63
Discharge: HOME | End: 2023-09-19
Attending: FAMILY MEDICINE
Payer: COMMERCIAL

## 2023-09-19 DIAGNOSIS — I10 ESSENTIAL HYPERTENSION: ICD-10-CM

## 2023-09-19 DIAGNOSIS — R06.02 SOB (SHORTNESS OF BREATH): ICD-10-CM

## 2023-09-19 PROCEDURE — 93017 CV STRESS TEST TRACING ONLY: CPT | Performed by: FAMILY MEDICINE

## 2023-09-19 PROCEDURE — 93018 CV STRESS TEST I&R ONLY: CPT | Performed by: FAMILY MEDICINE

## 2023-09-23 ENCOUNTER — HOSPITAL ENCOUNTER (OUTPATIENT)
Dept: MAMMOGRAPHY | Age: 63
Discharge: HOME | End: 2023-09-23
Attending: FAMILY MEDICINE
Payer: COMMERCIAL

## 2023-09-23 DIAGNOSIS — Z12.31 BREAST CANCER SCREENING BY MAMMOGRAM: ICD-10-CM

## 2023-09-23 DIAGNOSIS — Z12.31 VISIT FOR SCREENING MAMMOGRAM: ICD-10-CM

## 2023-09-23 PROCEDURE — 77063 BREAST TOMOSYNTHESIS BI: CPT | Performed by: FAMILY MEDICINE

## 2023-09-23 PROCEDURE — 77067 SCR MAMMO BI INCL CAD: CPT | Performed by: FAMILY MEDICINE

## 2023-09-25 ENCOUNTER — TELEPHONE (OUTPATIENT)
Dept: FAMILY MEDICINE CLINIC | Facility: CLINIC | Age: 63
End: 2023-09-25

## 2023-09-25 NOTE — TELEPHONE ENCOUNTER
Pat had a mammogram 9/23/2023. Additional views of the L breast have been requested and have been ordered by mammography. Smiley Bowie also wanted to know the results of her Treadmill Stress Test. Tree Erazo noted it was completely normal. Pat verbalized understanding.

## 2023-09-27 ENCOUNTER — OFFICE VISIT (OUTPATIENT)
Dept: FAMILY MEDICINE CLINIC | Facility: CLINIC | Age: 63
End: 2023-09-27
Payer: COMMERCIAL

## 2023-09-27 VITALS
BODY MASS INDEX: 31.88 KG/M2 | RESPIRATION RATE: 16 BRPM | HEIGHT: 69 IN | SYSTOLIC BLOOD PRESSURE: 148 MMHG | HEART RATE: 67 BPM | OXYGEN SATURATION: 97 % | WEIGHT: 215.25 LBS | DIASTOLIC BLOOD PRESSURE: 70 MMHG

## 2023-09-27 DIAGNOSIS — R51.9 INTRACTABLE EPISODIC HEADACHE, UNSPECIFIED HEADACHE TYPE: Primary | ICD-10-CM

## 2023-09-27 DIAGNOSIS — I10 ESSENTIAL HYPERTENSION: ICD-10-CM

## 2023-09-27 DIAGNOSIS — M76.62 ACHILLES TENDINITIS OF LEFT LOWER EXTREMITY: ICD-10-CM

## 2023-09-27 PROCEDURE — 3078F DIAST BP <80 MM HG: CPT | Performed by: FAMILY MEDICINE

## 2023-09-27 PROCEDURE — 3008F BODY MASS INDEX DOCD: CPT | Performed by: FAMILY MEDICINE

## 2023-09-27 PROCEDURE — 3077F SYST BP >= 140 MM HG: CPT | Performed by: FAMILY MEDICINE

## 2023-09-27 PROCEDURE — 99214 OFFICE O/P EST MOD 30 MIN: CPT | Performed by: FAMILY MEDICINE

## 2023-09-27 RX ORDER — LEVOTHYROXINE SODIUM 0.12 MG/1
125 TABLET ORAL
COMMUNITY
Start: 2023-09-18

## 2023-10-05 ENCOUNTER — HOSPITAL ENCOUNTER (OUTPATIENT)
Dept: MAMMOGRAPHY | Facility: HOSPITAL | Age: 63
Discharge: HOME | End: 2023-10-05
Attending: FAMILY MEDICINE
Payer: COMMERCIAL

## 2023-10-05 DIAGNOSIS — R92.2 INCONCLUSIVE MAMMOGRAM: ICD-10-CM

## 2023-10-05 PROCEDURE — 77065 DX MAMMO INCL CAD UNI: CPT | Performed by: FAMILY MEDICINE

## 2023-10-05 PROCEDURE — 77061 BREAST TOMOSYNTHESIS UNI: CPT | Performed by: FAMILY MEDICINE

## 2023-10-05 PROCEDURE — 76642 ULTRASOUND BREAST LIMITED: CPT | Performed by: FAMILY MEDICINE

## 2023-10-19 ENCOUNTER — TELEPHONE (OUTPATIENT)
Dept: FAMILY MEDICINE CLINIC | Facility: CLINIC | Age: 63
End: 2023-10-19

## 2023-10-19 NOTE — TELEPHONE ENCOUNTER
This is a common reaction to amlodipine, it is annoying but not serious health issue. She can continue and we can discuss next week or stop it now and see how it does before next week.  Thanks and stay well, Liban Potter MD

## 2023-10-19 NOTE — TELEPHONE ENCOUNTER
For last couple of weeks has had this problem from the time she started taking amlodipine. The swelling decreases when elevated no pain so we discussed dependent edema and what to do in the short term to decrease the swelling. She will elevate this as much as possible and buy some compression stockings. She will bring up the swelling at her appointment.  Told her to continue the medication until her appointment

## 2023-10-19 NOTE — TELEPHONE ENCOUNTER
Pt is having swelling in her foot and wants to know what to do before she comes in next Friday or if she should be seen sooner

## 2023-10-20 NOTE — TELEPHONE ENCOUNTER
Pt states saw Dr. Juan Parker 9/27/23 and he increased her Amlodipine to 10mg (2 tabs) due to elevated BP.   Pt wants to know if she should just go back to the Amlodipine 5mg (1 tab)

## 2023-10-27 ENCOUNTER — OFFICE VISIT (OUTPATIENT)
Dept: FAMILY MEDICINE CLINIC | Facility: CLINIC | Age: 63
End: 2023-10-27

## 2023-10-27 VITALS
HEIGHT: 69 IN | RESPIRATION RATE: 16 BRPM | BODY MASS INDEX: 31.45 KG/M2 | WEIGHT: 212.38 LBS | HEART RATE: 74 BPM | DIASTOLIC BLOOD PRESSURE: 68 MMHG | SYSTOLIC BLOOD PRESSURE: 120 MMHG

## 2023-10-27 DIAGNOSIS — I10 ESSENTIAL HYPERTENSION: Primary | ICD-10-CM

## 2023-10-27 DIAGNOSIS — R05.3 CHRONIC COUGH: ICD-10-CM

## 2023-10-27 DIAGNOSIS — R73.03 PREDIABETES: ICD-10-CM

## 2023-10-27 PROCEDURE — 3008F BODY MASS INDEX DOCD: CPT | Performed by: FAMILY MEDICINE

## 2023-10-27 PROCEDURE — 99214 OFFICE O/P EST MOD 30 MIN: CPT | Performed by: FAMILY MEDICINE

## 2023-10-27 PROCEDURE — 3078F DIAST BP <80 MM HG: CPT | Performed by: FAMILY MEDICINE

## 2023-10-27 PROCEDURE — 3074F SYST BP LT 130 MM HG: CPT | Performed by: FAMILY MEDICINE

## 2023-10-27 RX ORDER — FAMOTIDINE 20 MG/1
20 TABLET, FILM COATED ORAL 2 TIMES DAILY
Qty: 180 TABLET | Refills: 3 | Status: SHIPPED | OUTPATIENT
Start: 2023-10-27

## 2023-10-27 NOTE — ASSESSMENT & PLAN NOTE
BP shows poor control with last BP of 148/70. Lifestyle changes, diet, exercise and weight loss were counseled. Medication changes as listed. Last K was 4.3 done on 9/5/2023. Last Cr was 1.1 done on 9/5/2023.

## 2024-02-04 DIAGNOSIS — I10 ESSENTIAL HYPERTENSION: ICD-10-CM

## 2024-02-06 RX ORDER — AMLODIPINE BESYLATE 5 MG/1
TABLET ORAL
Qty: 90 TABLET | Refills: 0 | Status: SHIPPED | OUTPATIENT
Start: 2024-02-06

## 2024-02-06 NOTE — TELEPHONE ENCOUNTER
Requested Prescriptions     Pending Prescriptions Disp Refills    AMLODIPINE 5 MG Oral Tab [Pharmacy Med Name: AMLODIPINE BESYLATE 5 MG TAB] 90 tablet 1     Sig: TAKE 1 TABLET BY MOUTH EVERY DAY     LOV 10/27/2023     Patient was asked to follow-up in:     Appointment scheduled: Visit date not found     Medication refilled per protocol.

## 2024-03-28 DIAGNOSIS — E89.0 POSTPROCEDURAL HYPOTHYROIDISM: ICD-10-CM

## 2024-03-28 RX ORDER — LEVOTHYROXINE SODIUM 0.12 MG/1
125 TABLET ORAL DAILY
Qty: 90 TABLET | Refills: 1 | Status: SHIPPED | OUTPATIENT
Start: 2024-03-28

## 2024-03-28 NOTE — TELEPHONE ENCOUNTER
Initial Discharge Planning Assessment:8/1/23    Patient admitted on:7/31/23    Chart reviewed, Care plan discussed with treatment team, attending     PCP updated in Epic: correct in [facility]    Pharmacy updated in [facility]    DME at home:[facility]    Current Dispo:home with HH    Transportation:has reliable    POA/LW:none on file      Anticipated DC needs from CM perspecrive:home health    I certify I provided patient choice and a list to the patient/family of CMS rated Home Health  .  Patient/Family signed Patient's Choice Disclosure Form choosing the following  [facility]  Initial Discharge Assessment       Primary Care Provider: Stacy Rodriguez MD    Admission Diagnosis: SOB (shortness of breath) [R06.02]  Elevated troponin [R77.8]  Heart failure [I50.9]  Acute on chronic congestive heart failure, unspecified heart failure type [I50.9]    Admission Date: 7/31/2023  Expected Discharge Date:     Transition of Care Barriers: (P) None    Payor: [employer] MANAGED MEDICARE / Plan: HU[employer] TOTAL CARE ADVANTAGE / Product Type: Medicare Advantage /     Extended Emergency Contact Information  Primary Emergency Contact: Cara Saini   [facility]  Home Phone: [phone]  Work Phone: [phone]  Mobile Phone: [phone]  Relation: Daughter  Secondary Emergency Contact: Deion Saini  Mobile Phone: [phone]  Relation: Grandchild    Discharge Plan A: (P) Home Health, Home  Discharge Plan B: (P) Home, Home Health      [facility] - [place] - [address]  [address] [place] [address]  Phone: [phone] Fax: [phone]    [facility] - [place], [place] - [address]  [address] [address]  Phone: [phone] Fax: [phone]    [id number] - [place], [place] - [address]  [address]  [place] [address]  Phone: [phone] Fax:  Requested Prescriptions     Pending Prescriptions Disp Refills    LEVOTHYROXINE 125 MCG Oral Tab [Pharmacy Med Name: LEVOTHYROXINE 125 MCG TABLET] 90 tablet 0     Sig: TAKE 1 TABLET BY MOUTH EVERY DAY     LOV 10/27/2023     Patient was asked to follow-up in: 6 months    Appointment scheduled: 9/13/2024 Tomas Fuller MD     Medication refilled per protocol.      Routed to Jonny Marin MD, for review.     [phone]    [facility]  [address]  [facility] [address]  Phone: [phone] Fax: [phone]      Initial Assessment (most recent)       Adult Discharge Assessment - 08/01/23 0904          Discharge Assessment    Assessment Type Discharge Planning Assessment (P)      Confirmed/corrected address, phone number and insurance Yes (P)      Source of Information family;patient (P)      When was your last doctors appointment? -- (P)    a month ago    Communicated BNE with patient/caregiver Date not available/Unable to determine;Yes (P)      Reason For Admission CHF (P)      People in Home child(glenn), adult (P)      Facility Arrived From: home (P)      Do you expect to return to your current living situation? Yes (P)      Do you have help at home or someone to help you manage your care at home? Yes (P)      Who are your caregiver(s) and their phone number(s)? Cara Saini, daughter, [phone] (P)      Prior to hospitilization cognitive status: Alert/Oriented (P)    hard of hearing    Current cognitive status: Alert/Oriented (P)    hard of hearing    Walking or Climbing Stairs ambulation difficulty, requires equipment (P)      Mobility Management RW, WC (P)      Dressing/Bathing bathing difficulty, requires equipment;bathing difficulty, assistance 1 person (P)      Dressing/Bathing Management assistance, daughter (P)      Home Accessibility wheelchair accessible (P)      Home Layout Able to live on 1st floor (P)      Equipment Currently Used at Home walker, rolling;oxygen (P)      Readmission within 30 days? No (P)      Patient currently being followed by outpatient case management? No (P)      Do you currently have service(s) that help you manage your care at home? No (P)      Do you take prescription medications? Yes (P)      Do you have prescription coverage? Yes (P)      Coverage [employer] (P)      Do you have any problems affording any of your prescribed medications? No (P)      Is  the patient taking medications as prescribed? yes (P)      How do you get to doctors appointments? family or friend will provide (P)      Are you on dialysis? No (P)      Do you take coumadin? No (P)      Discharge Plan A Home Health;Home (P)      Discharge Plan B Home;Home Health (P)      DME Needed Upon Discharge  none (P)      Discharge Plan discussed with: Adult children (P)      Transition of Care Barriers None (P)                                     Case management to follow for plans and arrangements

## 2024-05-08 DIAGNOSIS — I10 ESSENTIAL HYPERTENSION: ICD-10-CM

## 2024-05-10 RX ORDER — AMLODIPINE BESYLATE 5 MG/1
TABLET ORAL
Qty: 90 TABLET | Refills: 0 | Status: SHIPPED | OUTPATIENT
Start: 2024-05-10

## 2024-05-10 NOTE — TELEPHONE ENCOUNTER
Requested Prescriptions     Pending Prescriptions Disp Refills    AMLODIPINE 5 MG Oral Tab [Pharmacy Med Name: AMLODIPINE BESYLATE 5 MG TAB] 90 tablet 0     Sig: TAKE 1 TABLET BY MOUTH EVERY DAY     LOV 10/27/2023     Patient was asked to follow-up in: 6 months    Appointment scheduled: 9/13/2024 Tomas uFller MD     Medication refilled per protocol.

## 2024-08-07 ENCOUNTER — TELEPHONE (OUTPATIENT)
Dept: FAMILY MEDICINE CLINIC | Facility: CLINIC | Age: 64
End: 2024-08-07

## 2024-08-07 DIAGNOSIS — E78.5 DYSLIPIDEMIA: ICD-10-CM

## 2024-08-07 DIAGNOSIS — Z00.00 LABORATORY EXAMINATION ORDERED AS PART OF A ROUTINE GENERAL MEDICAL EXAMINATION: ICD-10-CM

## 2024-08-07 DIAGNOSIS — N18.31 STAGE 3A CHRONIC KIDNEY DISEASE (CKD) (HCC): ICD-10-CM

## 2024-08-07 DIAGNOSIS — Z13.0 SCREENING FOR IRON DEFICIENCY ANEMIA: ICD-10-CM

## 2024-08-07 DIAGNOSIS — Z13.6 SCREENING FOR CARDIOVASCULAR CONDITION: ICD-10-CM

## 2024-08-07 DIAGNOSIS — R73.9 HYPERGLYCEMIA: ICD-10-CM

## 2024-08-07 DIAGNOSIS — Z12.31 VISIT FOR SCREENING MAMMOGRAM: ICD-10-CM

## 2024-08-07 DIAGNOSIS — Z13.1 SCREENING FOR DIABETES MELLITUS: Primary | ICD-10-CM

## 2024-08-07 DIAGNOSIS — Z13.29 SCREENING FOR THYROID DISORDER: ICD-10-CM

## 2024-08-07 DIAGNOSIS — I10 ESSENTIAL HYPERTENSION: ICD-10-CM

## 2024-08-07 DIAGNOSIS — E07.9 THYROID DISORDER: ICD-10-CM

## 2024-08-07 DIAGNOSIS — Z12.31 SCREENING MAMMOGRAM FOR BREAST CANCER: Primary | ICD-10-CM

## 2024-08-07 RX ORDER — AMLODIPINE BESYLATE 5 MG/1
TABLET ORAL
Qty: 90 TABLET | Refills: 0 | Status: SHIPPED | OUTPATIENT
Start: 2024-08-07

## 2024-08-07 NOTE — TELEPHONE ENCOUNTER
Please enter lab orders for the patient's upcoming physical appointment.     Physical scheduled:   Your appointments       Date & Time Appointment Department ([place])    Sep 13, 2024 8:15 AM CDT Physical - Established with Tomas Fuller MD [facility] ([facility])              [facility]  [address]  [facility] [address]  [phone]           Preferred lab: [facility] ([facility])     The patient has been notified to complete fasting labs prior to their physical appointment.

## 2024-08-07 NOTE — TELEPHONE ENCOUNTER
Requested Prescriptions     Pending Prescriptions Disp Refills    AMLODIPINE 5 MG Oral Tab [Pharmacy Med Name: AMLODIPINE BESYLATE 5 MG TAB] 90 tablet 0     Sig: TAKE 1 TABLET BY MOUTH EVERY DAY     LOV 10/27/2023     Patient was asked to follow-up in: 6 months    Appointment scheduled: 9/13/2024 Tomas Fuller MD     Medication refilled per protocol.

## 2024-08-07 NOTE — TELEPHONE ENCOUNTER
1. Screening for diabetes mellitus (Primary)  -     Comp Metabolic Panel (14); Future; Expected date: 08/07/2024  2. Visit for screening mammogram  -     Mammogram Bilateral Screening with 3D; Future; Expected date: 08/07/2024  3. Screening for iron deficiency anemia  -     CBC With Differential With Platelet; Future; Expected date: 08/07/2024  4. Screening for thyroid disorder  -     TSH W Reflex To Free T4; Future; Expected date: 08/07/2024  5. Screening for cardiovascular condition  -     Lipid Panel; Future; Expected date: 08/07/2024  6. Laboratory examination ordered as part of a routine general medical examination  -     TSH W Reflex To Free T4; Future; Expected date: 08/07/2024  -     Lipid Panel; Future; Expected date: 08/07/2024  -     CBC With Differential With Platelet; Future; Expected date: 08/07/2024  -     Comp Metabolic Panel (14); Future; Expected date: 08/07/2024  -     Hemoglobin A1C; Future; Expected date: 08/07/2024  7. Hyperglycemia  -     Comp Metabolic Panel (14); Future; Expected date: 08/07/2024  -     Hemoglobin A1C; Future; Expected date: 08/07/2024  8. Dyslipidemia  -     TSH W Reflex To Free T4; Future; Expected date: 08/07/2024  -     Lipid Panel; Future; Expected date: 08/07/2024  9. Stage 3a chronic kidney disease (CKD) (HCC)  -     CBC With Differential With Platelet; Future; Expected date: 08/07/2024  -     Comp Metabolic Panel (14); Future; Expected date: 08/07/2024  10. Thyroid disorder  -     TSH W Reflex To Free T4; Future; Expected date: 08/07/2024  11. Essential hypertension  Overview:  Amlodipine 5  Orders:  -     TSH W Reflex To Free T4; Future; Expected date: 08/07/2024  -     CBC With Differential With Platelet; Future; Expected date: 08/07/2024  -     Comp Metabolic Panel (14); Future; Expected date: 08/07/2024       OK to notify. Thanks, Martínez Fuller MD

## 2024-08-07 NOTE — TELEPHONE ENCOUNTER
Patient is requesting an order for her annual screening mammogram.    Patient has been notified to allow 2-3 business days for order placement. Reviewed with patient that she may schedule mammogram via [employer] or by calling Central Scheduling at that time.    Request for mammogram order routed to triage.       Just wants to make sure if Dr. Fuller is just wanting a regular screening or if he is wanting a different kind of test to be done

## 2024-08-31 DIAGNOSIS — E89.0 POSTPROCEDURAL HYPOTHYROIDISM: ICD-10-CM

## 2024-08-31 RX ORDER — LEVOTHYROXINE SODIUM 125 UG/1
125 TABLET ORAL DAILY
Qty: 90 TABLET | Refills: 1 | Status: SHIPPED | OUTPATIENT
Start: 2024-08-31

## 2024-09-10 ENCOUNTER — LAB ENCOUNTER (OUTPATIENT)
Dept: LAB | Age: 64
End: 2024-09-10
Attending: FAMILY MEDICINE
Payer: COMMERCIAL

## 2024-09-10 DIAGNOSIS — Z13.29 SCREENING FOR THYROID DISORDER: ICD-10-CM

## 2024-09-10 DIAGNOSIS — Z13.6 SCREENING FOR CARDIOVASCULAR CONDITION: ICD-10-CM

## 2024-09-10 DIAGNOSIS — R73.9 HYPERGLYCEMIA: ICD-10-CM

## 2024-09-10 DIAGNOSIS — I10 ESSENTIAL HYPERTENSION: ICD-10-CM

## 2024-09-10 DIAGNOSIS — E07.9 THYROID DISORDER: ICD-10-CM

## 2024-09-10 DIAGNOSIS — Z13.1 SCREENING FOR DIABETES MELLITUS: ICD-10-CM

## 2024-09-10 DIAGNOSIS — E78.5 DYSLIPIDEMIA: ICD-10-CM

## 2024-09-10 DIAGNOSIS — Z13.0 SCREENING FOR IRON DEFICIENCY ANEMIA: ICD-10-CM

## 2024-09-10 DIAGNOSIS — Z00.00 LABORATORY EXAMINATION ORDERED AS PART OF A ROUTINE GENERAL MEDICAL EXAMINATION: ICD-10-CM

## 2024-09-10 DIAGNOSIS — N18.31 STAGE 3A CHRONIC KIDNEY DISEASE (CKD) (HCC): ICD-10-CM

## 2024-09-10 LAB
ALBUMIN SERPL-MCNC: 4.4 G/DL (ref 3.2–4.8)
ALBUMIN/GLOB SERPL: 1.3 {RATIO} (ref 1–2)
ALP LIVER SERPL-CCNC: 77 U/L
ALT SERPL-CCNC: 9 U/L
ANION GAP SERPL CALC-SCNC: 8 MMOL/L (ref 0–18)
AST SERPL-CCNC: 14 U/L (ref ?–34)
BASOPHILS # BLD AUTO: 0.03 X10(3) UL (ref 0–0.2)
BASOPHILS NFR BLD AUTO: 0.5 %
BILIRUB SERPL-MCNC: 0.6 MG/DL (ref 0.2–1.1)
BUN BLD-MCNC: 11 MG/DL (ref 9–23)
CALCIUM BLD-MCNC: 9.9 MG/DL (ref 8.7–10.4)
CHLORIDE SERPL-SCNC: 106 MMOL/L (ref 98–112)
CHOLEST SERPL-MCNC: 213 MG/DL (ref ?–200)
CO2 SERPL-SCNC: 26 MMOL/L (ref 21–32)
CREAT BLD-MCNC: 1.18 MG/DL
EGFRCR SERPLBLD CKD-EPI 2021: 52 ML/MIN/1.73M2 (ref 60–?)
EOSINOPHIL # BLD AUTO: 0.12 X10(3) UL (ref 0–0.7)
EOSINOPHIL NFR BLD AUTO: 2.1 %
ERYTHROCYTE [DISTWIDTH] IN BLOOD BY AUTOMATED COUNT: 14.3 %
EST. AVERAGE GLUCOSE BLD GHB EST-MCNC: 123 MG/DL (ref 68–126)
FASTING PATIENT LIPID ANSWER: YES
FASTING STATUS PATIENT QL REPORTED: YES
GLOBULIN PLAS-MCNC: 3.3 G/DL (ref 2–3.5)
GLUCOSE BLD-MCNC: 90 MG/DL (ref 70–99)
HBA1C MFR BLD: 5.9 % (ref ?–5.7)
HCT VFR BLD AUTO: 39.3 %
HDLC SERPL-MCNC: 48 MG/DL (ref 40–59)
HGB BLD-MCNC: 12.7 G/DL
IMM GRANULOCYTES # BLD AUTO: 0.01 X10(3) UL (ref 0–1)
IMM GRANULOCYTES NFR BLD: 0.2 %
LDLC SERPL CALC-MCNC: 145 MG/DL (ref ?–100)
LYMPHOCYTES # BLD AUTO: 3.3 X10(3) UL (ref 1–4)
LYMPHOCYTES NFR BLD AUTO: 56.5 %
MCH RBC QN AUTO: 29.1 PG (ref 26–34)
MCHC RBC AUTO-ENTMCNC: 32.3 G/DL (ref 31–37)
MCV RBC AUTO: 89.9 FL
MONOCYTES # BLD AUTO: 0.46 X10(3) UL (ref 0.1–1)
MONOCYTES NFR BLD AUTO: 7.9 %
NEUTROPHILS # BLD AUTO: 1.92 X10 (3) UL (ref 1.5–7.7)
NEUTROPHILS # BLD AUTO: 1.92 X10(3) UL (ref 1.5–7.7)
NEUTROPHILS NFR BLD AUTO: 32.8 %
NONHDLC SERPL-MCNC: 165 MG/DL (ref ?–130)
OSMOLALITY SERPL CALC.SUM OF ELEC: 289 MOSM/KG (ref 275–295)
PLATELET # BLD AUTO: 235 10(3)UL (ref 150–450)
POTASSIUM SERPL-SCNC: 4.1 MMOL/L (ref 3.5–5.1)
PROT SERPL-MCNC: 7.7 G/DL (ref 5.7–8.2)
RBC # BLD AUTO: 4.37 X10(6)UL
SODIUM SERPL-SCNC: 140 MMOL/L (ref 136–145)
T4 FREE SERPL-MCNC: 1.8 NG/DL (ref 0.8–1.7)
TRIGL SERPL-MCNC: 110 MG/DL (ref 30–149)
TSI SER-ACNC: 0.51 MIU/ML (ref 0.55–4.78)
VLDLC SERPL CALC-MCNC: 21 MG/DL (ref 0–30)
WBC # BLD AUTO: 5.8 X10(3) UL (ref 4–11)

## 2024-09-10 PROCEDURE — 84443 ASSAY THYROID STIM HORMONE: CPT

## 2024-09-10 PROCEDURE — 85025 COMPLETE CBC W/AUTO DIFF WBC: CPT

## 2024-09-10 PROCEDURE — 84439 ASSAY OF FREE THYROXINE: CPT

## 2024-09-10 PROCEDURE — 80053 COMPREHEN METABOLIC PANEL: CPT

## 2024-09-10 PROCEDURE — 80061 LIPID PANEL: CPT

## 2024-09-10 PROCEDURE — 36415 COLL VENOUS BLD VENIPUNCTURE: CPT

## 2024-09-10 PROCEDURE — 83036 HEMOGLOBIN GLYCOSYLATED A1C: CPT

## 2024-09-12 NOTE — ASSESSMENT & PLAN NOTE
BP shows good control with last BP of 120/68. Continue lifestyle changes, diet, exercise and weight loss.   9/10/2024: Potassium 4.1; Creatinine 1.18 (H); eGFR-Cr 52 (L)   Stable, continue present management and continue to monitor for progression  Hypertensiver kidney disease with CKD 3a.

## 2024-09-12 NOTE — ASSESSMENT & PLAN NOTE
Thyroid shows Good control.   9/10/2024: TSH 0.512 (L)  Thryoid Meds: levothyroxine Tabs - 125 MCG well controlled current treatment plan effective, no change in therapy

## 2024-09-12 NOTE — ASSESSMENT & PLAN NOTE
9/10/2024: HgbA1C 5.9 (H); Glucose 90 Sugar control is stable  Continue with current treatment plan  Pre-Diabetic. Not currently on Diabetic meds.

## 2024-09-13 ENCOUNTER — OFFICE VISIT (OUTPATIENT)
Dept: FAMILY MEDICINE CLINIC | Facility: CLINIC | Age: 64
End: 2024-09-13
Payer: COMMERCIAL

## 2024-09-13 VITALS
WEIGHT: 218.38 LBS | RESPIRATION RATE: 12 BRPM | SYSTOLIC BLOOD PRESSURE: 124 MMHG | BODY MASS INDEX: 32.35 KG/M2 | HEIGHT: 69 IN | HEART RATE: 76 BPM | DIASTOLIC BLOOD PRESSURE: 70 MMHG

## 2024-09-13 DIAGNOSIS — R73.03 PREDIABETES: ICD-10-CM

## 2024-09-13 DIAGNOSIS — E05.00 GRAVES' DISEASE: ICD-10-CM

## 2024-09-13 DIAGNOSIS — Z00.00 ANNUAL PHYSICAL EXAM: Primary | ICD-10-CM

## 2024-09-13 DIAGNOSIS — I10 ESSENTIAL HYPERTENSION: ICD-10-CM

## 2024-09-13 PROCEDURE — 99396 PREV VISIT EST AGE 40-64: CPT | Performed by: FAMILY MEDICINE

## 2024-09-13 NOTE — PROGRESS NOTES
Katie Higgins is a 63 year old female who presents for a complete physical exam.     had concerns including Physical (WWE, no pap, see's gyne ).   No topic due editable text      Subjective:    She complains of dogin well overall. . Headaches lately, noticies headaches up when thi shappens.     Tobacco:  She has never smoked tobacco.     Wt Readings from Last 4 Encounters:   09/13/24 218 lb 6.4 oz (99.1 kg)   10/27/23 212 lb 6.4 oz (96.3 kg)   09/27/23 215 lb 4 oz (97.6 kg)   09/11/23 212 lb 9.6 oz (96.4 kg)     Body mass index is 32.25 kg/m².     The 10-year ASCVD risk score (Damian MONROY, et al., 2019) is: 8.8%    Values used to calculate the score:      Age: 63 years      Sex: Female      Is Non- : Yes      Diabetic: No      Tobacco smoker: No      Systolic Blood Pressure: 124 mmHg      Is BP treated: Yes      HDL Cholesterol: 48 mg/dL      Total Cholesterol: 213 mg/dL    Chief Complaint Reviewed and Verified  Nursing Notes Reviewed and   Verified  Tobacco Reviewed  Allergies Reviewed  Medications Reviewed    Problem List Reviewed  Medical History Reviewed  Surgical History   Reviewed  OB Status Reviewed  Family History Reviewed          Her family history includes Heart Attack in her father; Heart Disease in her maternal grandfather, maternal grandmother, mother, paternal grandfather, and paternal grandmother; No Known Problems in her daughter and daughter; Ovarian Cancer in her maternal aunt; Thyroid Disorder in her maternal grandmother.   She  reports that she has never smoked. She has never used smokeless tobacco. She reports that she does not drink alcohol and does not use drugs.    Exercise: three times per week.  Diet: watches minimally    There are no preventive care reminders to display for this patient.   Last Dexa Scan:    XR DEXA BONE DENSITOMETRY (CPT=77080) 08/19/2014     Health Maintenance   Topic Date Due    Colorectal Cancer Screening  12/04/2027      No  recommendations at this time   Health Maintenance Due   Topic Date Due    Annual Depression Screening  01/01/2024    Pap Smear  08/27/2024    COVID-19 Vaccine (5 - 2023-24 season) 09/01/2024    Annual Physical  09/11/2024    Mammogram  10/05/2024         Review of Systems   Constitutional: Negative.  Negative for activity change, appetite change, chills and fever.   HENT: Negative.     Eyes: Negative.    Respiratory: Negative.  Negative for shortness of breath.    Cardiovascular: Negative.  Negative for chest pain and palpitations.   Gastrointestinal: Negative.  Negative for abdominal pain.   Genitourinary: Negative.  Negative for dysuria.   Musculoskeletal:  Negative for arthralgias.   Skin: Negative.  Negative for rash.   Allergic/Immunologic: Negative.    Neurological: Negative.         Results:    Lab Results   Component Value Date/Time    WBC 5.8 09/10/2024 07:32 AM    HGB 12.7 09/10/2024 07:32 AM    .0 09/10/2024 07:32 AM      Lab Results   Component Value Date/Time    GLU 90 09/10/2024 07:32 AM     09/10/2024 07:32 AM    K 4.1 09/10/2024 07:32 AM     09/10/2024 07:32 AM    CO2 26.0 09/10/2024 07:32 AM    CREATSERUM 1.18 (H) 09/10/2024 07:32 AM    CA 9.9 09/10/2024 07:32 AM    ALB 4.4 09/10/2024 07:32 AM    TP 7.7 09/10/2024 07:32 AM    ALKPHO 77 09/10/2024 07:32 AM    AST 14 09/10/2024 07:32 AM    ALT 9 (L) 09/10/2024 07:32 AM    BILT 0.6 09/10/2024 07:32 AM    TSH 0.512 (L) 09/10/2024 07:32 AM    T4F 1.8 (H) 09/10/2024 07:32 AM        Lab Results   Component Value Date/Time    CHOLEST 213 (H) 09/10/2024 07:32 AM    HDL 48 09/10/2024 07:32 AM    TRIG 110 09/10/2024 07:32 AM     (H) 09/10/2024 07:32 AM    NONHDLC 165 (H) 09/10/2024 07:32 AM       Last A1c value was 5.9% done 9/10/2024.     Vitamin D:     Lab Results   Component Value Date    VITD 34.9 09/05/2023          Objective:    EXAM:  /70   Pulse 76   Resp 12   Ht 5' 9\" (1.753 m)   Wt 218 lb 6.4 oz (99.1 kg)   BMI  32.25 kg/m²  Estimated body mass index is 32.25 kg/m² as calculated from the following:    Height as of this encounter: 5' 9\" (1.753 m).    Weight as of this encounter: 218 lb 6.4 oz (99.1 kg).   Physical Exam  Vitals and nursing note reviewed.   Constitutional:       General: She is not in acute distress.     Appearance: Normal appearance.   HENT:      Head: Normocephalic and atraumatic.      Right Ear: Tympanic membrane and external ear normal.      Left Ear: Tympanic membrane and external ear normal.      Nose: Nose normal.      Mouth/Throat:      Mouth: Mucous membranes are moist.   Eyes:      Extraocular Movements: Extraocular movements intact.      Pupils: Pupils are equal, round, and reactive to light.   Cardiovascular:      Rate and Rhythm: Normal rate and regular rhythm.      Pulses: Normal pulses.           Carotid pulses are 2+ on the right side and 2+ on the left side.       Radial pulses are 2+ on the right side and 2+ on the left side.        Dorsalis pedis pulses are 2+ on the right side and 2+ on the left side.        Posterior tibial pulses are 2+ on the right side and 2+ on the left side.      Heart sounds: Normal heart sounds, S1 normal and S2 normal. No murmur heard.  Pulmonary:      Effort: Pulmonary effort is normal.      Breath sounds: Normal breath sounds.   Abdominal:      General: Abdomen is flat. Bowel sounds are normal. There is no distension.      Palpations: Abdomen is soft.   Musculoskeletal:         General: Normal range of motion.      Cervical back: Normal range of motion and neck supple.      Right lower leg: No edema.      Left lower leg: No edema.   Skin:     General: Skin is warm and dry.      Capillary Refill: Capillary refill takes less than 2 seconds.   Neurological:      General: No focal deficit present.      Mental Status: She is alert and oriented to person, place, and time.   Psychiatric:         Mood and Affect: Mood normal.         Behavior: Behavior normal.          Thought Content: Thought content normal.          Assessment & Plan:    Katie Higgins is a 63 year old female who presents for a complete physical exam.   Pt's weight is Body mass index is 32.25 kg/m²., recommended low fat diet and aerobic exercise 30 minutes three times weekly.   Health maintenance, Up to date    Immunizations: Up to date   Immunization History   Administered Date(s) Administered    Covid-19 Vaccine Pfizer 30 mcg/0.3 ml 03/18/2021, 04/08/2021, 11/18/2021    Covid-19 Vaccine Pfizer Bivalent 30mcg/0.3mL 11/19/2022    Covid-19 Vaccine Pfizer Louie-Sucrose 30 mcg/0.3 ml 05/29/2022    FLUZONE 6 months and older PFS 0.5 ml (53421) 09/09/2020, 10/04/2021, 10/15/2022, 09/11/2023    Influenza 10/01/2019    TDAP 06/18/2021    Zoster Vaccine Recombinant Adjuvanted (Shingrix) 08/31/2022, 11/07/2022         Pt info given for: exercise, low fat diet, The patient indicates understanding of these issues and agrees to the plan.  The patient is asked to return for CPX in 1 years.    Assessment:  1. Annual physical exam (Primary)  2. Essential hypertension  Overview:  Amlodipine 5  Assessment & Plan:  BP shows good control with last BP of 120/68. Continue lifestyle changes, diet, exercise and weight loss.   9/10/2024: Potassium 4.1; Creatinine 1.18 (H); eGFR-Cr 52 (L)   Stable, continue present management and continue to monitor for progression  Hypertensiver kidney disease with CKD 3a.  3. Graves' disease  Overview:  Managed by Dr Peterson, Dx 1.2015, on levothyroxine 100  Assessment & Plan:  Thyroid shows Good control.   9/10/2024: TSH 0.512 (L)  Thryoid Meds: levothyroxine Tabs - 125 MCG well controlled current treatment plan effective, no change in therapy   4. Prediabetes  Overview:  A1c 6.0% 2019  Assessment & Plan:  9/10/2024: HgbA1C 5.9 (H); Glucose 90 Sugar control is stable  Continue with current treatment plan  Pre-Diabetic. Not currently on Diabetic meds.   Doing well overall, stable labs, Pap with her  gynecologist soon, we discussed mammogram where she has some denser breast material and left breast area pain.  Keep watching closely and mammogram is coming up soon, has not been normal so far  I am having Sofy CAMARILLO Ronlado maintain her albuterol, ergocalciferol, famotidine, amLODIPine, and levothyroxine.     Return in about 6 months (around 3/13/2025) for recheck.

## 2024-09-25 ENCOUNTER — HOSPITAL ENCOUNTER (OUTPATIENT)
Dept: MAMMOGRAPHY | Age: 64
Discharge: HOME | End: 2024-09-25
Attending: FAMILY MEDICINE
Payer: COMMERCIAL

## 2024-09-25 DIAGNOSIS — Z12.31 VISIT FOR SCREENING MAMMOGRAM: ICD-10-CM

## 2024-09-25 PROCEDURE — 77063 BREAST TOMOSYNTHESIS BI: CPT | Performed by: FAMILY MEDICINE

## 2024-09-25 PROCEDURE — 77067 SCR MAMMO BI INCL CAD: CPT | Performed by: FAMILY MEDICINE

## 2024-09-26 ENCOUNTER — TELEPHONE (OUTPATIENT)
Dept: FAMILY MEDICINE CLINIC | Facility: CLINIC | Age: 64
End: 2024-09-26

## 2024-09-26 NOTE — TELEPHONE ENCOUNTER
Patient was calling for a nurse/ Dr. Fuller to go over her elke result that was completed yesterday.

## 2024-11-03 DIAGNOSIS — I10 ESSENTIAL HYPERTENSION: ICD-10-CM

## 2024-11-04 RX ORDER — AMLODIPINE BESYLATE 5 MG/1
TABLET ORAL
Qty: 90 TABLET | Refills: 0 | Status: SHIPPED | OUTPATIENT
Start: 2024-11-04

## 2024-11-04 NOTE — TELEPHONE ENCOUNTER
Requested Prescriptions     Pending Prescriptions Disp Refills    AMLODIPINE 5 MG Oral Tab [Pharmacy Med Name: AMLODIPINE BESYLATE 5 MG TAB] 90 tablet 0     Sig: TAKE 1 TABLET BY MOUTH EVERY DAY     LOV 9/13/2024     Patient was asked to follow-up in: 6 months    Appointment scheduled: 3/14/2025 Tomas Fuller MD     Medication refilled per protocol.

## 2024-11-11 ENCOUNTER — OFFICE VISIT (OUTPATIENT)
Facility: CLINIC | Age: 64
End: 2024-11-11
Payer: COMMERCIAL

## 2024-11-11 VITALS
HEART RATE: 64 BPM | DIASTOLIC BLOOD PRESSURE: 62 MMHG | SYSTOLIC BLOOD PRESSURE: 122 MMHG | BODY MASS INDEX: 32 KG/M2 | WEIGHT: 214 LBS

## 2024-11-11 DIAGNOSIS — N39.41 URGE INCONTINENCE: ICD-10-CM

## 2024-11-11 DIAGNOSIS — Z12.4 ENCOUNTER FOR PAPANICOLAOU SMEAR FOR CERVICAL CANCER SCREENING: ICD-10-CM

## 2024-11-11 DIAGNOSIS — N89.8 VAGINAL ODOR: ICD-10-CM

## 2024-11-11 DIAGNOSIS — Z01.419 ENCOUNTER FOR ANNUAL ROUTINE GYNECOLOGICAL EXAMINATION: Primary | ICD-10-CM

## 2024-11-11 PROCEDURE — 87624 HPV HI-RISK TYP POOLED RSLT: CPT | Performed by: STUDENT IN AN ORGANIZED HEALTH CARE EDUCATION/TRAINING PROGRAM

## 2024-11-11 PROCEDURE — 99397 PER PM REEVAL EST PAT 65+ YR: CPT | Performed by: STUDENT IN AN ORGANIZED HEALTH CARE EDUCATION/TRAINING PROGRAM

## 2024-11-11 PROCEDURE — 88175 CYTOPATH C/V AUTO FLUID REDO: CPT | Performed by: STUDENT IN AN ORGANIZED HEALTH CARE EDUCATION/TRAINING PROGRAM

## 2024-11-11 PROCEDURE — 99459 PELVIC EXAMINATION: CPT | Performed by: STUDENT IN AN ORGANIZED HEALTH CARE EDUCATION/TRAINING PROGRAM

## 2024-11-11 PROCEDURE — 81514 NFCT DS BV&VAGINITIS DNA ALG: CPT | Performed by: STUDENT IN AN ORGANIZED HEALTH CARE EDUCATION/TRAINING PROGRAM

## 2024-11-11 NOTE — PROGRESS NOTES
[facility]  Obstetrics and Gynecology   History & Physical    Chief complaint:   Chief Complaint   Patient presents with    Wellness Visit     Last Pap 21, negative        HPI: Katie Higgins is a 64 year old  with No LMP recorded. (Menstrual status: Menopause).      Here for annual GYN exam  Pt has been having issues with vaginal odor for some time. Just doesn't feel \"fresh\". She reports PCP had rxed diflucan in past for yeast infection. No notable discharge though. Will use Rephresh and that helps  Also has issues with UUI - even to the point that if she is on a trip she will try not to drink a lot of fluids so she doesn't have to run to the bathroom. Will get a sudden urge then has to go to bathroom right away or may have leaking. Makes sure not to wear tight pants so they are not hard to pull down  Saw urogyn in the past, tried pelvic floor PT and didn't help. Would consider another urogyn visit    Never had any abnormal paps    Hx Prior Abnormal Pap: No  Pap Date: 21  Pap Result Notes: Negative    PMHx/Surghx reviewed, below. Of note: HTN on amlodipine, graves disease s/p radioactive iodine tx now on levothyroxine, preDM  Famhx: maternal aunt ovarian cancer    PCP: Tomas Fuller MD    Review of Systems:  Constitutional:  Denies fatigue, night sweats, hot flashes  Eyes:  denies blurred or double vision  Cardiovascular:  denies chest pain or palpitations  Respiratory:  denies shortness of breath  Gastrointestinal:  denies heartburn, abdominal pain, diarrhea or constipation  Genitourinary:  denies dysuria, abnormal vaginal discharge, vaginal itching  Musculoskeletal:  denies back pain.  Skin/Breast:  Denies any breast pain, lumps, or discharge.   Neurological:  denies headaches, extremity weakness or numbness.  Psychiatric: denies depression or anxiety.  Endocrine:   denies excessive thirst or urination.  Heme/Lymph:  denies history of anemia, easy bruising or bleeding.    OB  History:  OB History    Para Term  AB Living   3 2 1 1 1 2   SAB IAB Ectopic Multiple Live Births     1     2      # Outcome Date GA Lbr George/2nd Weight Sex Type Anes PTL Lv   3  88    F Caesarean   PE[place]   2 Term 86    F Caesarean   PEYTON   1 IAB                Meds:  Medications Ordered Prior to Encounter[1]    All:  Allergies[2]    PMH:  Past Medical History:    Essential hypertension    Thyroid disease       PSH:  Past Surgical History:   Procedure Laterality Date       and     Cholecystectomy  1996    Colonoscopy  2010    Dr. Mitchell- repeat in 5 yrs d/t suboptimal prep                          Social History:  Social History     Socioeconomic History    Marital status:      Spouse name: Not on file    Number of children: Not on file    Years of education: Not on file    Highest education level: Not on file   Occupational History    Occupation:  assistant   Tobacco Use    Smoking status: Never    Smokeless tobacco: Never   Vaping Use    Vaping status: Never Used   Substance and Sexual Activity    Alcohol use: No     Alcohol/week: 0.0 standard drinks of alcohol    Drug use: No    Sexual activity: Not Currently   Other Topics Concern     Service Not Asked    Blood Transfusions Not Asked    Caffeine Concern Yes     Comment: 1 cup tea daily    Occupational Exposure Not Asked    Hobby Hazards Not Asked    Sleep Concern No    Stress Concern Not Asked    Weight Concern Not Asked    Special Diet Not Asked    Back Care Not Asked    Exercise Yes     Comment: 1 hour daily     Bike Helmet Not Asked    Seat Belt Yes    Self-Exams No   Social History Narrative    Not on file     Social Drivers of Health     Financial Resource Strain: Not on file   Food Insecurity: Not on file   Transportation Needs: Not on file   Physical Activity: Not on file   Stress: Not on file   Social Connections: Unknown (3/13/2021)    Received from [facility]  [facility], [facility]    Social Connections     Conversations with friends/family/neighbors per week: Not on file   Housing Stability: Not on file        Family History:  Family History   Problem Relation Age of Onset    Heart Attack Father         MI, passed away     Heart Disease Mother     No Known Problems Daughter     No Known Problems Daughter     Heart Disease Maternal Grandmother     Thyroid Disorder Maternal Grandmother     Heart Disease Maternal Grandfather     Heart Disease Paternal Grandmother     Heart Disease Paternal Grandfather     Ovarian Cancer Maternal Aunt        PHYSICAL EXAM:     Vitals:    24 0929   BP: 122/62   Pulse: 64   Weight: 214 lb (97.1 kg)       Body mass index is 31.6 kg/m².      Constitutional: well developed, well nourished  Head/Face: normocephalic  Neck/Thyroid: thyroid symmetric, no thyromegaly, no nodules, no adenopathy  Lymphatic: no abnormal supraclavicular or axillary adenopathy is noted  Breast: normal without palpable masses, tenderness, asymmetry, nipple discharge, nipple retraction or skin changes  Abdomen:  soft, nontender, nondistended, no masses  Skin/Hair: no unusual rashes or bruises  Extremities: no edema, no cyanosis  Psychiatric:  Oriented to time, place, person and situation. Appropriate mood and affect    Pelvic Exam:  External Genitalia: normal appearance, hair distribution, and no lesions  Urethral Meatus:  normal in size, location, without lesions and prolapse  Bladder:  No fullness, masses or tenderness  Vagina:  Normal appearance without lesions, there is white clumpy discharge - pt reports this is what rephresh looks like though  Cervix:  Normal without tenderness on motion  Uterus: normal in size, contour, position, mobility, without tenderness  Adnexa: normal without masses or tenderness  Perineum: normal    Assessment & Plan:     Katie Higgins is a 64 year old      Diagnoses and all orders for this  visit:    Encounter for annual routine gynecological examination    Encounter for Papanicolaou smear for cervical cancer screening  -     ThinPrep PAP Smear; Future  -     Hpv Dna  High Risk , Thin Prep Collect; Future    Vaginal odor  -     Vaginitis Vaginosis PCR Panel; Future    Urge incontinence  -     OP REFERRAL TO UROGYNECOLOGY CLINIC       Will f/u vaginitis swab, discharge does have appearance of yeast however pt did use rephresh last night and says it looks similar  If yeast is positive then can consider extended course diflucan if recurrent yeast infection - will call pt once results (doesn't check mychart often)  Also discussed possibility that odor could be from urine if has some slight leaking - pt has significant UUI sx regardless, given urogyn referral      Healthcare maintenance:  Pap: done, d/w pt per [facility] can discontinue screening with shared decision making, pt would like to continue, that is fine considering her overall health status is good   Mammogram done 9/2024, neg  Colonoscopy 12/2020 repeat 7y  DEXA, age 64 - had through [facility] this year per pt, result ok        Alecia Chung MD  [facility] - OBGYN          [1]   Current Outpatient Medications on File Prior to Visit   Medication Sig Dispense Refill    AMLODIPINE 5 MG Oral Tab TAKE 1 TABLET BY MOUTH EVERY DAY 90 tablet 0    LEVOTHYROXINE 125 MCG Oral Tab TAKE 1 TABLET BY MOUTH EVERY DAY 90 tablet 1    famotidine 20 MG Oral Tab Take 1 tablet (20 mg total) by mouth 2 (two) times daily. 180 tablet 3    ergocalciferol 1.25 MG (85958 UT) Oral Cap Take 1 capsule (50,000 Units total) by mouth every 14 (fourteen) days. 7 capsule 3    Albuterol Sulfate  (90 Base) MCG/ACT Inhalation Aero Soln Inhale 2 puffs into the lungs every 6 (six) hours as needed for Wheezing. (Patient not taking: Reported on 11/11/2024) 1 Inhaler 2     No current facility-administered medications on file prior to visit.   [2]   Allergies  Allergen Reactions    Clemizole  HIVES    Pcn [Penicillins] DIARRHEA and SWELLING    Augmentin [Amoclan] ITCHING     Augmentin, Diarrhea

## 2024-11-12 ENCOUNTER — TELEPHONE (OUTPATIENT)
Facility: CLINIC | Age: 64
End: 2024-11-12

## 2024-11-12 LAB
BV BACTERIA DNA VAG QL NAA+PROBE: NEGATIVE
C GLABRATA DNA VAG QL NAA+PROBE: NEGATIVE
C KRUSEI DNA VAG QL NAA+PROBE: NEGATIVE
CANDIDA DNA VAG QL NAA+PROBE: NEGATIVE
HPV E6+E7 MRNA CVX QL NAA+PROBE: NEGATIVE
T VAGINALIS DNA VAG QL NAA+PROBE: NEGATIVE

## 2024-11-12 NOTE — TELEPHONE ENCOUNTER
Will notify pt once pap smear result received and reviewed. Patient verbalized understanding, agreed to and intend to comply with plan of care.

## 2024-11-14 LAB
.: NORMAL
.: NORMAL

## 2024-11-21 ENCOUNTER — TELEPHONE (OUTPATIENT)
Facility: CLINIC | Age: 64
End: 2024-11-21

## 2025-01-02 ENCOUNTER — TELEPHONE (OUTPATIENT)
Dept: FAMILY MEDICINE CLINIC | Facility: CLINIC | Age: 65
End: 2025-01-02

## 2025-01-02 NOTE — TELEPHONE ENCOUNTER
Patient calling she has a cold/ cough/ runny nose. Wants to know what medication she could take on top of her medication list that she has.   Has had cough for 4 to 5 days.

## 2025-01-02 NOTE — TELEPHONE ENCOUNTER
Pt has upper respiratory symptoms, cough and congestion for 5 days. Denies fever or difficulty breathing. She was taking robitussin but realized she shouldn't due to her high BP. Scheduled appointment for tomorrow at 9:15 am.

## 2025-01-03 ENCOUNTER — OFFICE VISIT (OUTPATIENT)
Dept: FAMILY MEDICINE CLINIC | Facility: CLINIC | Age: 65
End: 2025-01-03
Payer: COMMERCIAL

## 2025-01-03 VITALS
BODY MASS INDEX: 31.95 KG/M2 | HEART RATE: 88 BPM | HEIGHT: 69 IN | SYSTOLIC BLOOD PRESSURE: 130 MMHG | RESPIRATION RATE: 14 BRPM | DIASTOLIC BLOOD PRESSURE: 74 MMHG | WEIGHT: 215.69 LBS | OXYGEN SATURATION: 96 %

## 2025-01-03 DIAGNOSIS — J01.41 ACUTE RECURRENT PANSINUSITIS: Primary | ICD-10-CM

## 2025-01-03 PROCEDURE — 99213 OFFICE O/P EST LOW 20 MIN: CPT | Performed by: FAMILY MEDICINE

## 2025-01-03 RX ORDER — LEVOFLOXACIN 750 MG/1
750 TABLET, FILM COATED ORAL DAILY
Qty: 5 TABLET | Refills: 0 | Status: SHIPPED | OUTPATIENT
Start: 2025-01-03 | End: 2025-01-08

## 2025-01-03 RX ORDER — PREDNISONE 10 MG/1
TABLET ORAL
Qty: 35 TABLET | Refills: 0 | Status: SHIPPED | OUTPATIENT
Start: 2025-01-03 | End: 2025-01-28

## 2025-01-03 NOTE — PROGRESS NOTES
Subjective:   Sofy is a 64 year old female coming in for had concerns including Cough (Started on Sunday, cough has gotten worse, body is aching ).   HPI   Uri for 3 weeks, better, then worse last 5 days, neg for COVID.     Objective:   /74   Pulse 88   Resp 14   Ht 5' 9\" (1.753 m)   Wt 215 lb 11.2 oz (97.8 kg)   SpO2 96%   BMI 31.85 kg/m²  Body mass index is 31.85 kg/m².   Physical Exam  Vitals and nursing note reviewed.   Constitutional:       General: She is not in acute distress.  HENT:      Head: Normocephalic.      Right Ear: Tympanic membrane, ear canal and external ear normal.      Left Ear: Tympanic membrane and external ear normal.      Nose: Mucosal edema present. No septal deviation or rhinorrhea.      Right Sinus: Frontal sinus tenderness present.      Mouth/Throat:      Pharynx: Posterior oropharyngeal erythema present. No oropharyngeal exudate.   Eyes:      Conjunctiva/sclera: Conjunctivae normal.   Cardiovascular:      Rate and Rhythm: Normal rate and regular rhythm.      Heart sounds: Normal heart sounds.   Pulmonary:      Effort: Pulmonary effort is normal. No respiratory distress.      Breath sounds: Normal breath sounds.   Abdominal:      Palpations: Abdomen is soft.   Musculoskeletal:      Cervical back: Normal range of motion.   Skin:     General: Skin is warm and dry.   Neurological:      Mental Status: She is alert and oriented to person, place, and time.           Assessment & Plan  Acute recurrent pansinusitis  Trial of stonger antibiotic and prednisone if no change with 3 weeks of symptoms, worsening this week.  Orders:    levoFLOXacin; Take 1 tablet (750 mg total) by mouth daily for 5 days.  Dispense: 5 tablet; Refill: 0    predniSONE; Take 1 tablet (10 mg total) by mouth 3 (three) times daily for 5 days, THEN 1 tablet (10 mg total) 2 (two) times daily for 5 days, THEN 1 tablet (10 mg total) daily for 5 days, THEN 0.5 tablets (5 mg total) daily for 10 days.  Dispense: 35  tablet; Refill: 0             I am having Sofy Higgins start on levoFLOXacin and predniSONE. I am also having her maintain her ergocalciferol, famotidine, levothyroxine, and amLODIPine.       Return in about 3 months (around 4/3/2025) for Or sooner if symptoms persist.

## 2025-01-06 ENCOUNTER — TELEPHONE (OUTPATIENT)
Dept: FAMILY MEDICINE CLINIC | Facility: CLINIC | Age: 65
End: 2025-01-06

## 2025-01-06 DIAGNOSIS — R05.2 SUBACUTE COUGH: Primary | ICD-10-CM

## 2025-01-06 NOTE — TELEPHONE ENCOUNTER
Patient update:she  is still coughing she is taking her prednisone. She is asking if she should go in for a chest xray today, which she states was discussed at her last appointment on Friday. Please advise. Thank you.

## 2025-01-06 NOTE — TELEPHONE ENCOUNTER
Let patient know the CXR was ordered. Provided number for central scheduling. Discussed OTC Mucinex and Mucinex DM. Patient verbalized agreement and understanding.

## 2025-01-06 NOTE — TELEPHONE ENCOUNTER
Patient saw Dr. Fuller 01/03/25 and he told her to call on Monday if she still had her cough. Patient is still coughing, started prednisone on Sunday, wants to know if Dr. Fuller wants her to now go for a chest x-ray. Please call

## 2025-01-07 ENCOUNTER — HOSPITAL ENCOUNTER (OUTPATIENT)
Dept: GENERAL RADIOLOGY | Age: 65
Discharge: HOME | End: 2025-01-07
Attending: FAMILY MEDICINE
Payer: COMMERCIAL

## 2025-01-07 DIAGNOSIS — R05.2 SUBACUTE COUGH: ICD-10-CM

## 2025-01-07 PROCEDURE — 71046 X-RAY EXAM CHEST 2 VIEWS: CPT | Performed by: FAMILY MEDICINE

## 2025-01-31 DIAGNOSIS — I10 ESSENTIAL HYPERTENSION: ICD-10-CM

## 2025-02-01 RX ORDER — AMLODIPINE BESYLATE 5 MG/1
TABLET ORAL
Qty: 90 TABLET | Refills: 0 | Status: SHIPPED | OUTPATIENT
Start: 2025-02-01

## 2025-02-01 NOTE — TELEPHONE ENCOUNTER
Requested Prescriptions     Signed Prescriptions Disp Refills    AMLODIPINE 5 MG Oral Tab 90 tablet 0     Sig: TAKE 1 TABLET BY MOUTH EVERY DAY     Authorizing Provider: EARLENE DYER     Ordering User: MAVIS ESPOSITO      Refilled per protocol/OV notes

## 2025-02-14 NOTE — ASSESSMENT & PLAN NOTE
Addended by: ADRIANNE HANSEN on: 2/14/2025 12:54 PM     Modules accepted: Orders     Stable, Continue present management.     Blood Pressure and Cardiac Medications          AmLODIPine Besylate 5 MG Oral Tab

## 2025-03-18 ENCOUNTER — TELEPHONE (OUTPATIENT)
Facility: CLINIC | Age: 65
End: 2025-03-18

## 2025-03-18 NOTE — TELEPHONE ENCOUNTER
Spoke with patient. Scheduling information given for Dr. Kinney & Dr. Humphries. Verbalized understanding.

## 2025-03-18 NOTE — TELEPHONE ENCOUNTER
Pt asking for list of doctors recommended by HAKAN for UROGYNECOLOGY CLINIC, said HAKAN placed referral for her on 11/11/2024

## 2025-04-01 ENCOUNTER — TELEPHONE (OUTPATIENT)
Dept: FAMILY MEDICINE CLINIC | Facility: CLINIC | Age: 65
End: 2025-04-01

## 2025-04-01 DIAGNOSIS — Z12.31 VISIT FOR SCREENING MAMMOGRAM: Primary | ICD-10-CM

## 2025-04-01 NOTE — TELEPHONE ENCOUNTER
Patient is requesting an order for her annual screening mammogram.    Patient has been notified to allow 2-3 business days for order placement. Reviewed with patient that she may schedule mammogram via [employer] or by calling Central Scheduling at that time.    Request for mammogram order routed to triage.

## 2025-04-24 ENCOUNTER — OFFICE VISIT (OUTPATIENT)
Dept: FAMILY MEDICINE CLINIC | Facility: CLINIC | Age: 65
End: 2025-04-24
Payer: COMMERCIAL

## 2025-04-24 VITALS
HEIGHT: 69 IN | DIASTOLIC BLOOD PRESSURE: 70 MMHG | RESPIRATION RATE: 14 BRPM | HEART RATE: 66 BPM | OXYGEN SATURATION: 97 % | WEIGHT: 215.19 LBS | SYSTOLIC BLOOD PRESSURE: 130 MMHG | BODY MASS INDEX: 31.87 KG/M2

## 2025-04-24 DIAGNOSIS — M25.462 EFFUSION OF LEFT KNEE: Primary | ICD-10-CM

## 2025-04-24 DIAGNOSIS — G89.29 CHRONIC PAIN OF LEFT ANKLE: ICD-10-CM

## 2025-04-24 DIAGNOSIS — M25.572 CHRONIC PAIN OF LEFT ANKLE: ICD-10-CM

## 2025-04-24 PROCEDURE — 99214 OFFICE O/P EST MOD 30 MIN: CPT | Performed by: FAMILY MEDICINE

## 2025-04-24 NOTE — PROGRESS NOTES
The following individual(s) verbally consented to be recorded using ambient AI listening technology and understand that they can each withdraw their consent to this listening technology at any point by asking the clinician to turn off or pause the recording:    Patient name: Katie URBAN Ronaldo

## 2025-04-24 NOTE — PROGRESS NOTES
Subjective:   Sofy is a 64 year old female coming in for had concerns including Swelling (Left ankle and leg swollen, been a couple of weeks ).   HPI  History of Present Illness  Sofy Higgins is a 64 year old female who presents with swelling in the left ankle and knee.    She experiences swelling and pain in her left ankle and knee, which intensifies with movement throughout the day. The pain is particularly pronounced in the ankle when wearing shoes due to swelling. Initially, the pain is minimal upon waking but increases with activity. She experiences increased discomfort when ascending stairs and after prolonged sitting.    Five years ago, imaging of her knees showed no significant arthropathy or swelling. She has not had recent imaging, with the last x-ray conducted in March five years ago, which revealed no significant arthritis.    She experiences tightness in her calf muscles, which she believes contributes to her pain. She describes difficulty in stretching her foot and mentions that both feet are tight.     Left knee swelling,     /70   Pulse 66   Resp 14   Ht 5' 9\" (1.753 m)   Wt 215 lb 3.2 oz (97.6 kg)   SpO2 97%   BMI 31.78 kg/m²  Body mass index is 31.78 kg/m².   Physical Exam  Vitals and nursing note reviewed.   Constitutional:       General: She is not in acute distress.     Appearance: Normal appearance.   HENT:      Head: Normocephalic.   Cardiovascular:      Rate and Rhythm: Normal rate and regular rhythm.      Pulses:           Posterior tibial pulses are 2+ on the right side and 2+ on the left side.      Heart sounds: Normal heart sounds. No murmur heard.  Pulmonary:      Effort: Pulmonary effort is normal. No respiratory distress.      Breath sounds: Normal breath sounds. No wheezing.   Abdominal:      General: Bowel sounds are normal.      Palpations: Abdomen is soft.      Tenderness: There is no abdominal tenderness.   Musculoskeletal:      Cervical back: Normal range of motion.       Right lower leg: No edema.      Left lower leg: No edema.   Skin:     General: Skin is warm and dry.      Findings: No rash.   Neurological:      Mental Status: She is alert and oriented to person, place, and time.   Psychiatric:         Mood and Affect: Mood normal.         Behavior: Behavior normal.         Thought Content: Thought content normal.         Judgment: Judgment normal.        Physical Exam  MUSCULOSKELETAL: Mild effusion in the left knee. Normal range of motion in the left knee. Tight calf muscle on the left. Limited dorsiflexion in the left foot.        Results  RADIOLOGY  Knee X-ray: No significant arthropathy or effusion (03/06/2020)     Assessment & Plan  Effusion of left knee    Orders:    XR KNEE (1 OR 2 VIEWS), LEFT (CPT=73560); Future; Expected date: 04/24/2025    Chronic pain of left ankle    Orders:    XR ANKLE WEIGHTBEARING (3 VIEWS), LEFT (CPT=73610); Future; Expected date: 04/24/2025             Assessment & Plan  Left ankle and knee swelling  Swelling in the left ankle and knee with fluid in the knee. Differential includes local inflammation, arthritis, or injury. Unilateral swelling makes heart-related causes unlikely.  - Order x-ray of the left ankle and knee.  - Instruct on stretching exercises for the calf muscles.  - Consider blood tests for inflammation if symptoms persist.  - Re-evaluate in 2-4 weeks and consider physical therapy if no improvement.    Tight calf muscles  Tightness in the calf muscles causing pain and limited range of motion in the foot and knee.  - Instruct on stretching exercises for the calf muscles.  - Advise on performing stretches before bed, 30 seconds per stretch per day per side.  - Re-evaluate in 2-4 weeks and consider physical therapy if no improvement.    Plantar fasciitis  Pain in the arch of the foot, worse with activity and as the day progresses. Symptoms consistent with plantar fasciitis.  - Instruct on stretching exercises for the plantar  fascia.  - Advise on using a belt or rope for stretching exercises.  - Recommend foot massagers or rolling pin for stretching the foot.  I am having Sofy Higgins maintain her ergocalciferol, famotidine, levothyroxine, and amLODIPine.       Return in about 5 months (around 9/8/2025) for Annual physical, Or sooner if symptoms persist.

## 2025-05-01 DIAGNOSIS — E89.0 POSTPROCEDURAL HYPOTHYROIDISM: ICD-10-CM

## 2025-05-01 DIAGNOSIS — I10 ESSENTIAL HYPERTENSION: ICD-10-CM

## 2025-05-01 RX ORDER — AMLODIPINE BESYLATE 5 MG/1
5 TABLET ORAL DAILY
Qty: 90 TABLET | Refills: 0 | Status: SHIPPED | OUTPATIENT
Start: 2025-05-01

## 2025-05-01 RX ORDER — LEVOTHYROXINE SODIUM 125 UG/1
125 TABLET ORAL DAILY
Qty: 90 TABLET | Refills: 0 | Status: SHIPPED | OUTPATIENT
Start: 2025-05-01

## 2025-05-01 NOTE — TELEPHONE ENCOUNTER
Levothyroxine 125mcg    LOV 4/24/2025     Patient was asked to follow-up in: 5 months     Appointment scheduled: 9/19/2025 Tomas Fuller MD     Medication failed protocol.    Routed to Dr. Jonny MD

## 2025-05-20 ENCOUNTER — TELEPHONE (OUTPATIENT)
Dept: UROLOGY | Facility: CLINIC | Age: 65
End: 2025-05-20

## 2025-05-27 ENCOUNTER — OFFICE VISIT (OUTPATIENT)
Dept: UROLOGY | Facility: CLINIC | Age: 65
End: 2025-05-27
Attending: OBSTETRICS & GYNECOLOGY
Payer: COMMERCIAL

## 2025-05-27 VITALS — RESPIRATION RATE: 18 BRPM | WEIGHT: 215 LBS | TEMPERATURE: 98 F | BODY MASS INDEX: 31.84 KG/M2 | HEIGHT: 69 IN

## 2025-05-27 DIAGNOSIS — R35.1 NOCTURIA: ICD-10-CM

## 2025-05-27 DIAGNOSIS — N39.3 FEMALE STRESS INCONTINENCE: ICD-10-CM

## 2025-05-27 DIAGNOSIS — N89.8 VAGINAL DISCHARGE: ICD-10-CM

## 2025-05-27 DIAGNOSIS — N39.41 URGE INCONTINENCE: Primary | ICD-10-CM

## 2025-05-27 DIAGNOSIS — N95.2 POSTMENOPAUSAL ATROPHIC VAGINITIS: ICD-10-CM

## 2025-05-27 DIAGNOSIS — N81.84 PELVIC MUSCLE WASTING: ICD-10-CM

## 2025-05-27 LAB
BILIRUB UR QL STRIP.AUTO: NEGATIVE
COLOR UR AUTO: YELLOW
CONTROL RUN WITHIN 24 HOURS?: YES
GLUCOSE UR STRIP.AUTO-MCNC: NORMAL MG/DL
KETONE URINE: NEGATIVE
KETONES UR STRIP.AUTO-MCNC: NEGATIVE MG/DL
LEUKOCYTE ESTERASE UR QL STRIP.AUTO: 75
NITRITE UR QL STRIP.AUTO: NEGATIVE
NITRITE URINE: NEGATIVE
PH UR STRIP.AUTO: 5.5 [PH] (ref 5–8)
PROT UR STRIP.AUTO-MCNC: NEGATIVE MG/DL
RBC UR QL AUTO: NEGATIVE
SP GR UR STRIP.AUTO: 1.03 (ref 1–1.03)
UROBILINOGEN UR STRIP.AUTO-MCNC: 2 MG/DL

## 2025-05-27 PROCEDURE — 81002 URINALYSIS NONAUTO W/O SCOPE: CPT | Performed by: OBSTETRICS & GYNECOLOGY

## 2025-05-27 PROCEDURE — 99212 OFFICE O/P EST SF 10 MIN: CPT

## 2025-05-27 PROCEDURE — 81514 NFCT DS BV&VAGINITIS DNA ALG: CPT | Performed by: OBSTETRICS & GYNECOLOGY

## 2025-05-27 PROCEDURE — 87086 URINE CULTURE/COLONY COUNT: CPT | Performed by: OBSTETRICS & GYNECOLOGY

## 2025-05-27 PROCEDURE — 81001 URINALYSIS AUTO W/SCOPE: CPT | Performed by: OBSTETRICS & GYNECOLOGY

## 2025-05-27 NOTE — PROGRESS NOTES
Patient presents to follow up UUI    Last seen   Did PFPT  Currently c/o UUI  Rare ADAM    Vag odor  Nocturia x3  No bulge sx  Not sexually active    Bowels reg  Rx'd vag estrogen, not using it    No UTIs  Denies s/sx of UTI  No gross hematuria    , c/sx2    Temp 98 °F (36.7 °C)   Resp 18   Ht 69\"   Wt 215 lb (97.5 kg)   BMI 31.75 kg/m²     GEN: NAD  CV: RRR  Pulm: nl effort  Abd: soft    PELVIC EXAM:  Ext. Gen: no atrophy, no lesions  Urethra: some atrophy, nontender  Bladder:+fullness, nontender  Vagina: +atrophy  Vag discharge, swab collected  Cervix: no bleeding, no lesions, nontender  Uterus: +mobile  Adnexa:no masses, nontender  Perineum: nontender  Anus: wnl  Rectum: defer     PELVIS FLOOR NEUROMUSCULAR FUNCTION:  Strength:  3 and Unable to hold greater than 3 sec  Perineal Sensation:  Normal     PELVIC SUPPORT:  Una:  0  Ant:  1  Post:  0  CST:  negative  UVJ: somewhat hypermobile    Impression/Plan:    ICD-10-CM    1. Urge incontinence  N39.41 Urinalysis, Routine     Urine Culture, Routine     POCT urinalysis dipstick[86703]      2. Female stress incontinence  N39.3 Urinalysis, Routine     Urine Culture, Routine     POCT urinalysis dipstick[[id number]]      3. Nocturia  R35.1 Urinalysis, Routine     Urine Culture, Routine     POCT urinalysis dipstick[[id number]]      4. Pelvic muscle wasting  N81.84       5. Postmenopausal atrophic vaginitis  N95.2 Urinalysis, Routine     Urine Culture, Routine     Vaginitis Vaginosis PCR Panel     POCT urinalysis dipstick[[id number]]      6. Vaginal discharge  N89.8           Discussion Items:   Urodynamics and cystoscopy for evaluation of LUTS  Behavioral and pharmacologic treatments for OAB  Pelvic muscle rehabilitation including pelvic floor PT  Topical estrogen therapy for treating UGA  Bowel routine/constipation regimen    Diagnostic Items:  Vag swab & urine testing    Medications Discussed:  Estrace Cream  Fiber  Miralax  OAB meds    Treatment Plan, Non-surgical:   RN  teaching/pt education done  Fiber supplement  Stimulant:  MOM, Miralax  Estrace / Premarin cream    Treatment Plan, Surgical:   None    All questions answered  She understands and agrees to plan    Return in about 3 months (around 8/27/2025).    Yancy Humphries DO, FACOG, FACS    The 21st Century Cures Act makes medical notes like these available to patients in the interest of transparency. However, be advised this is a medical document. It is intended as peer to peer communication. It is written in medical language and may contain abbreviations or verbiage that are unfamiliar. It may appear blunt or direct. Medical documents are intended to carry relevant information, facts as evident, and the clinical opinion of the practitioner.

## 2025-05-28 ENCOUNTER — TELEPHONE (OUTPATIENT)
Dept: UROLOGY | Facility: CLINIC | Age: 65
End: 2025-05-28

## 2025-05-28 LAB
BV BACTERIA DNA VAG QL NAA+PROBE: NEGATIVE
C GLABRATA DNA VAG QL NAA+PROBE: NEGATIVE
C KRUSEI DNA VAG QL NAA+PROBE: NEGATIVE
CANDIDA DNA VAG QL NAA+PROBE: POSITIVE
T VAGINALIS DNA VAG QL NAA+PROBE: NEGATIVE

## 2025-05-28 RX ORDER — FLUCONAZOLE 150 MG/1
150 TABLET ORAL
Qty: 2 TABLET | Refills: 0 | Status: SHIPPED | OUTPATIENT
Start: 2025-05-28 | End: 2025-06-01

## 2025-05-28 RX ORDER — ESTRADIOL 0.1 MG/G
CREAM VAGINAL
Qty: 42.5 G | Refills: 0 | Status: SHIPPED | OUTPATIENT
Start: 2025-05-28

## 2025-05-28 NOTE — TELEPHONE ENCOUNTER
TC to pt and notified of + yeast infection with recommendation for Fluconazole 150 mg PO x1 dose and repeat in 72 hours for total of 2 doses.    Pt verbalized understanding and agrees to plan.     Pt states that she has not received notification from pharmacy for Estrace vag cream as discussed at visit- wondering if Dr. Humphries sent Rx. Would like to pick both medications up at once.  Order placed for Estrace and sent to pt preferred pharmacy- ok per LAMAR Watson.

## 2025-05-28 NOTE — TELEPHONE ENCOUNTER
----- Message from Geovanna Reese sent at 5/28/2025  2:05 PM CDT -----  + yeast on vag swab, please order fluconazole 150 mg PO x 1 dose, repeat in 72 hours for total of 2 doses    ----- Message -----  From: Shirley Burr RN  Sent: 5/28/2025   1:17 PM CDT  To: Geovanna Reese PA-C      ----- Message -----  From: Lab, Background User  Sent: 5/27/2025   9:23 PM CDT  To: Jeronimo Joiner Clinical Staff

## 2025-07-30 DIAGNOSIS — I10 ESSENTIAL HYPERTENSION: ICD-10-CM

## 2025-07-31 ENCOUNTER — TELEPHONE (OUTPATIENT)
Dept: FAMILY MEDICINE CLINIC | Facility: CLINIC | Age: 65
End: 2025-07-31

## 2025-07-31 RX ORDER — AMLODIPINE BESYLATE 5 MG/1
5 TABLET ORAL DAILY
Qty: 90 TABLET | Refills: 3 | Status: SHIPPED | OUTPATIENT
Start: 2025-07-31

## 2025-08-18 ENCOUNTER — TELEPHONE (OUTPATIENT)
Dept: FAMILY MEDICINE CLINIC | Facility: CLINIC | Age: 65
End: 2025-08-18

## 2025-08-18 ENCOUNTER — TELEPHONE (OUTPATIENT)
Dept: UROLOGY | Facility: CLINIC | Age: 65
End: 2025-08-18

## 2025-08-18 DIAGNOSIS — N18.31 STAGE 3A CHRONIC KIDNEY DISEASE (CKD) (HCC): ICD-10-CM

## 2025-08-18 DIAGNOSIS — Z13.0 SCREENING FOR IRON DEFICIENCY ANEMIA: ICD-10-CM

## 2025-08-18 DIAGNOSIS — E07.9 THYROID DISORDER: ICD-10-CM

## 2025-08-18 DIAGNOSIS — Z12.31 VISIT FOR SCREENING MAMMOGRAM: Primary | ICD-10-CM

## 2025-08-18 DIAGNOSIS — I10 ESSENTIAL HYPERTENSION: ICD-10-CM

## 2025-08-18 DIAGNOSIS — Z13.1 SCREENING FOR DIABETES MELLITUS: ICD-10-CM

## 2025-08-18 DIAGNOSIS — Z13.29 SCREENING FOR THYROID DISORDER: ICD-10-CM

## 2025-08-18 DIAGNOSIS — Z13.6 SCREENING FOR CARDIOVASCULAR CONDITION: ICD-10-CM

## 2025-08-18 DIAGNOSIS — Z00.00 LABORATORY EXAMINATION ORDERED AS PART OF A ROUTINE GENERAL MEDICAL EXAMINATION: ICD-10-CM

## 2025-08-18 DIAGNOSIS — E78.5 DYSLIPIDEMIA: ICD-10-CM

## (undated) NOTE — Clinical Note
[facility] I saw Palma Gaston today with pelvic pain. I've recommended pelvic floor PT. I will work to manage her sx. I appreciate the opportunity to participate in her care.  Thanks, [facility]

## (undated) NOTE — LETTER
Date: 7/7/2017    Patient Name: Ramo Smith          To Whom it may concern:    Ramo Smith is under my care and is unable to work 7/6 through 7/12/2017 due to infectious illness.     Thank you,       Jarvis Braun MD, 7/7/2017, 9:55 AM

## (undated) NOTE — LETTER
ED [facility], [address] [facility], [address] [address] [address] [place] [address], [address]  [address] [facility]  [phone]                        Date: 7/12/2017    Patient Name: Power Book          To Whom it may concern:    Power Book is

## (undated) NOTE — LETTER
[address] [facility], [address] [place] [address], [address] [address] [address], [facility] [address] [facility]            18     Cruz Devlin   10/28/1960     [address] [id number]       Dear [facility]

## (undated) NOTE — LETTER
22    PATIENT NAME: Dave Griffin   : 10/28/1960       Waiver      DATE: 2022     Dear Patient,    Thank you for choosing [address] [place] [address] as your health care provider. Your physician has deemed the following medical service(s) necessary. However, your insurance plan may not pay for all of your health care and costs and may deny payment for this service. The fact that your insurance plan does not pay for an item or service does not mean you should not receive it. The purpose of this form is to help you make an informed decision about whether or not you want to receive this service(s) that may not be paid for by your insurance plan. ESTIMATED COST: Shingrix: $215  Administration: $48  Total: $263        I understand that the above mentioned service(s) or supply may not be covered by my insurance company. I agree to be financially responsible for the cost of this service or supply in the event my insurance denies payment as a non-covered benefit.       Patient/Insured Signature: ___________________________________________

## (undated) NOTE — Clinical Note
Hi - I saw Agnieszka Carrero today with continued c/o pelvic pain. I've recommended vag estrogen, more pelvic floor PT, and imaging -- she's worried about a mass. I will work to manage her sx. I appreciate the opportunity to participate in her care.  She also c/o BP issu

## (undated) NOTE — ED AVS SNAPSHOT
Anaid Rubi   MRN: [id number]    Department:  [facility] Emergency Department   Date of Visit:  11/29/2017           Disclosure     Insurance plans vary and the physician(s) referred by the ER may not be covered by your plan.  Please contact tell this physician (or your personal doctor if your instructions are to return to your personal doctor) about any new or lasting problems. The primary care or specialist physician will see patients referred from the [facility] Emergency Department.  Hosea Urrutia